# Patient Record
Sex: MALE | Race: WHITE | HISPANIC OR LATINO | Employment: FULL TIME | URBAN - METROPOLITAN AREA
[De-identification: names, ages, dates, MRNs, and addresses within clinical notes are randomized per-mention and may not be internally consistent; named-entity substitution may affect disease eponyms.]

---

## 2017-01-06 ENCOUNTER — HOSPITAL ENCOUNTER (EMERGENCY)
Facility: MEDICAL CENTER | Age: 22
End: 2017-01-06
Attending: EMERGENCY MEDICINE
Payer: MEDICAID

## 2017-01-06 VITALS
TEMPERATURE: 97.3 F | DIASTOLIC BLOOD PRESSURE: 78 MMHG | WEIGHT: 165 LBS | HEART RATE: 74 BPM | RESPIRATION RATE: 18 BRPM | HEIGHT: 71 IN | SYSTOLIC BLOOD PRESSURE: 135 MMHG | BODY MASS INDEX: 23.1 KG/M2

## 2017-01-06 DIAGNOSIS — S16.1XXA NECK MUSCLE STRAIN, INITIAL ENCOUNTER: ICD-10-CM

## 2017-01-06 PROCEDURE — A9270 NON-COVERED ITEM OR SERVICE: HCPCS | Performed by: EMERGENCY MEDICINE

## 2017-01-06 PROCEDURE — 36415 COLL VENOUS BLD VENIPUNCTURE: CPT

## 2017-01-06 PROCEDURE — 700102 HCHG RX REV CODE 250 W/ 637 OVERRIDE(OP): Performed by: EMERGENCY MEDICINE

## 2017-01-06 PROCEDURE — 99284 EMERGENCY DEPT VISIT MOD MDM: CPT

## 2017-01-06 RX ORDER — HYDROCODONE BITARTRATE AND ACETAMINOPHEN 5; 325 MG/1; MG/1
1 TABLET ORAL ONCE
Status: COMPLETED | OUTPATIENT
Start: 2017-01-06 | End: 2017-01-06

## 2017-01-06 RX ADMIN — HYDROCODONE BITARTRATE AND ACETAMINOPHEN 1 TABLET: 5; 325 TABLET ORAL at 21:35

## 2017-01-06 ASSESSMENT — PAIN SCALES - GENERAL: PAINLEVEL_OUTOF10: 4

## 2017-01-06 NOTE — ED AVS SNAPSHOT
1/6/2017          Evens Rocha  [address] [address] [facility] [place] [address]    Dear Evens:    [facility] wants to ensure your discharge home is safe and you or your loved ones have had all your questions answered regarding your care after you leave the hospital.    You may receive a telephone call within two days of your discharge.  This call is to make certain you understand your discharge instructions as well as ensure we provided you with the best care possible during your stay with us.     The call will only last approximately 3-5 minutes and will be done by a nurse.    Once again, we want to ensure your discharge home is safe and that you have a clear understanding of any next steps in your care.  If you have any questions or concerns, please do not hesitate to contact us, we are here for you.  Thank you for choosing [facility] for your healthcare needs.    Sincerely,    Pradeep Mane    [facility]

## 2017-01-06 NOTE — ED AVS SNAPSHOT
[employer] Access Code: [email or web address]  Expires: 2/5/2017  9:37 PM    Your email address is not on file at [facility].  Email Addresses are required for you to sign up for [employer], please contact [phone] to verify your personal information and to provide your email address prior to attempting to register for [employer].    Evens El Salgado  [address] [place] [address]    Club [employer]  A secure, online tool to manage your health information     [facility]’s Club Emp[employer]® is a secure, online tool that connects you to your personalized health information from the privacy of your home -- day or night - making it very easy for you to manage your healthcare. Once the activation process is completed, you can even access your medical information using the Club Emprende gertrude, which is available for free in the Apple Gertrude store or Google Play store.     To learn more about Club Emp[employer], visit [email or web address]    There are two levels of access available (as shown below):   My Chart Features  [facility] Primary Care Doctor [facility]  Specialists [facility]  Urgent  Care Non-[facility] Primary Care Doctor   Email your healthcare team securely and privately 24/7 X X X    Manage appointments: schedule your next appointment; view details of past/upcoming appointments X      Request prescription refills. X      View recent personal medical records, including lab and immunizations X X X X   View health record, including health history, allergies, medications X X X X   Read reports about your outpatient visits, procedures, consult and ER notes X X X X   See your discharge summary, which is a recap of your hospital and/or ER visit that includes your diagnosis, lab results, and care plan X X  X     How to register for [employer]:  Once your e-mail address has been verified, follow the following steps to sign up for [employer].     1. Go to  [email or web address]  2. Click on the Sign Up Now box, which takes you to the New Member Sign Up page.  You will need to provide the following information:  a. Enter your TRA Access Code exactly as it appears at the top of this page. (You will not need to use this code after you’ve completed the sign-up process. If you do not sign up before the expiration date, you must request a new code.)   b. Enter your date of birth.   c. Enter your home email address.   d. Click Submit, and follow the next screen’s instructions.  3. Create a [employer] ID. This will be your TRA login ID and cannot be changed, so think of one that is secure and easy to remember.  4. Create a TRA password. You can change your password at any time.  5. Enter your Password Reset Question and Answer. This can be used at a later time if you forget your password.   6. Enter your e-mail address. This allows you to receive e-mail notifications when new information is available in TRA.  7. Click Sign Up. You can now view your health information.    For assistance activating your TRA account, call [phone]

## 2017-01-07 NOTE — DISCHARGE INSTRUCTIONS
Cervical Sprain  A cervical sprain is when the tissues (ligaments) that hold the neck bones in place stretch or tear.  HOME CARE   · Put ice on the injured area.  · Put ice in a plastic bag.  · Place a towel between your skin and the bag.  · Leave the ice on for 15-20 minutes, 3-4 times a day.  · You may have been given a collar to wear. This collar keeps your neck from moving while you heal.  · Do not take the collar off unless told by your doctor.  · If you have long hair, keep it outside of the collar.  · Ask your doctor before changing the position of your collar. You may need to change its position over time to make it more comfortable.  · If you are allowed to take off the collar for cleaning or bathing, follow your doctor's instructions on how to do it safely.  · Keep your collar clean by wiping it with mild soap and water. Dry it completely. If the collar has removable pads, remove them every 1-2 days to hand wash them with soap and water. Allow them to air dry. They should be dry before you wear them in the collar.  · Do not drive while wearing the collar.  · Only take medicine as told by your doctor.  · Keep all doctor visits as told.  · Keep all physical therapy visits as told.  · Adjust your work station so that you have good posture while you work.  · Avoid positions and activities that make your problems worse.  · Warm up and stretch before being active.  GET HELP IF:  · Your pain is not controlled with medicine.  · You cannot take less pain medicine over time as planned.  · Your activity level does not improve as expected.  GET HELP RIGHT AWAY IF:   · You are bleeding.  · Your stomach is upset.  · You have an allergic reaction to your medicine.  · You develop new problems that you cannot explain.  · You lose feeling (become numb) or you cannot move any part of your body (paralysis).  · You have tingling or weakness in any part of your body.  · Your symptoms get worse. Symptoms include:  · Pain,  soreness, stiffness, puffiness (swelling), or a burning feeling in your neck.  · Pain when your neck is touched.  · Shoulder or upper back pain.  · Limited ability to move your neck.  · Headache.  · Dizziness.  · Your hands or arms feel week, lose feeling, or tingle.  · Muscle spasms.  · Difficulty swallowing or chewing.  MAKE SURE YOU:   · Understand these instructions.  · Will watch your condition.  · Will get help right away if you are not doing well or get worse.     This information is not intended to replace advice given to you by your health care provider. Make sure you discuss any questions you have with your health care provider.     Document Released: 06/05/2009 Document Revised: 08/20/2014 Document Reviewed: 06/25/2014  [employer] Interactive Patient Education ©2016 [employer]    Cryotherapy  Cryotherapy is when you put ice on your injury. Ice helps lessen pain and puffiness (swelling) after an injury. Ice works the best when you start using it in the first 24 to 48 hours after an injury.  HOME CARE  · Put a dry or damp towel between the ice pack and your skin.  · You may press gently on the ice pack.  · Leave the ice on for no more than 10 to 20 minutes at a time.  · Check your skin after 5 minutes to make sure your skin is okay.  · Rest at least 20 minutes between ice pack uses.  · Stop using ice when your skin loses feeling (numbness).  · Do not use ice on someone who cannot tell you when it hurts. This includes small children and people with memory problems (dementia).  GET HELP RIGHT AWAY IF:  · You have white spots on your skin.  · Your skin turns blue or pale.  · Your skin feels waxy or hard.  · Your puffiness gets worse.  MAKE SURE YOU:   · Understand these instructions.  · Will watch your condition.  · Will get help right away if you are not doing well or get worse.     This information is not intended to replace advice given to you by your health care provider. Make sure you discuss any  questions you have with your health care provider.     Document Released: 06/05/2009 Document Revised: 03/11/2013 Document Reviewed: 08/09/2012  [employer] Interactive Patient Education ©2016 [employer] Inc.
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
## 2018-04-18 ENCOUNTER — HOSPITAL ENCOUNTER (EMERGENCY)
Facility: MEDICAL CENTER | Age: 23
End: 2018-04-18
Attending: EMERGENCY MEDICINE
Payer: COMMERCIAL

## 2018-04-18 VITALS
OXYGEN SATURATION: 97 % | DIASTOLIC BLOOD PRESSURE: 84 MMHG | BODY MASS INDEX: 28.49 KG/M2 | TEMPERATURE: 98.2 F | HEIGHT: 71 IN | SYSTOLIC BLOOD PRESSURE: 124 MMHG | HEART RATE: 71 BPM | RESPIRATION RATE: 16 BRPM | WEIGHT: 203.48 LBS

## 2018-04-18 DIAGNOSIS — S05.02XA ABRASION OF LEFT CORNEA, INITIAL ENCOUNTER: ICD-10-CM

## 2018-04-18 PROCEDURE — 700102 HCHG RX REV CODE 250 W/ 637 OVERRIDE(OP): Performed by: EMERGENCY MEDICINE

## 2018-04-18 PROCEDURE — A9270 NON-COVERED ITEM OR SERVICE: HCPCS | Performed by: EMERGENCY MEDICINE

## 2018-04-18 PROCEDURE — 700101 HCHG RX REV CODE 250: Performed by: EMERGENCY MEDICINE

## 2018-04-18 PROCEDURE — 99284 EMERGENCY DEPT VISIT MOD MDM: CPT

## 2018-04-18 RX ORDER — PROPARACAINE HYDROCHLORIDE 5 MG/ML
SOLUTION/ DROPS OPHTHALMIC
Status: DISCONTINUED
Start: 2018-04-18 | End: 2018-04-18 | Stop reason: HOSPADM

## 2018-04-18 RX ORDER — IBUPROFEN 600 MG/1
600 TABLET ORAL ONCE
Status: COMPLETED | OUTPATIENT
Start: 2018-04-18 | End: 2018-04-18

## 2018-04-18 RX ORDER — ERYTHROMYCIN 5 MG/G
OINTMENT OPHTHALMIC EVERY 6 HOURS
Status: DISCONTINUED | OUTPATIENT
Start: 2018-04-18 | End: 2018-04-18 | Stop reason: HOSPADM

## 2018-04-18 RX ADMIN — IBUPROFEN 600 MG: 600 TABLET ORAL at 20:18

## 2018-04-18 RX ADMIN — ERYTHROMYCIN: 5 OINTMENT OPHTHALMIC at 20:18

## 2018-04-18 ASSESSMENT — PAIN SCALES - GENERAL
PAINLEVEL_OUTOF10: 0
PAINLEVEL_OUTOF10: 6

## 2018-04-18 NOTE — LETTER
"  FORM C-4:  EMPLOYEE’S CLAIM FOR COMPENSATION/ REPORT OF INITIAL TREATMENT  EMPLOYEE’S CLAIM - PROVIDE ALL INFORMATION REQUESTED   First Name  Evens Last Name  Bennett Birthdate             Age  1995 22 y.o. Sex  male Claim Number   Home Employee Address  [address]  [facility]                                     Zip  [address] Height  1.803 m (5' 11\") Weight  92.3 kg (203 lb 7.8 oz) [facility]     Mailing Employee Address                           [address]   [facility]               Zip  [address] Telephone  [phone] (home)  Primary Language Spoken  ENGLISH   Insurer  N/A Third Party   [employer] Employee's Occupation (Job Title) When Injury or Occupational Disease Occurred     Employer's Name  [employer] Telephone  [phone]    Employer Address  [address]] Zip  [address]   Date of Injury  4/18/2018       Hour of Injury  5:30 PM Date Employer Notified  4/18/2018 Last Day of Work after Injury or Occupational Disease  4/18/2018 Supervisor to Whom Injury Reported  [place]   Address or Location of Accident (if applicable)  [address] Replaced by [facility]   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 Be specific and answer in detail. Use additional sheet if necessary)  I was grinding a piece of metal with face shield on. some metal went in my eye still   If you believe that you have an occupational disease, when did you first have knowledge of the disability and it relationship to your employment?  n/a Witnesses to the Accident  Cleve Maynard     Nature of Injury or Occupational Disease  Foreign Body  Part(s) of Body Injured or Affected  Eye (L), N/A, N/A    I certify that the above is true and correct to the best of my knowledge and that I have provided this information in order to obtain the benefits of [place] " [place]  I hereby authorize any physician, chiropractor, surgeon, practitioner, or other person, any hospital, including [facility] or [facility] hospital, any medical service organization, any insurance company, or other institution or organization to release to each other, any medical or other information, including benefits paid or payable, pertinent to this injury or disease, except information relative to diagnosis, treatment and/or counseling for AIDS, psychological conditions, alcohol or controlled substances, for which I must give specific authorization.  A Photostat of this authorization shall be as valid as the original.   Date  April 18, 2018 Place  [facility] Employee’s Signature   THIS REPORT MUST BE COMPLETED AND MAILED WITHIN 3 WORKING DAYS OF TREATMENT   Place  [facility], EMERGENCY DEPT  Name of Facility   [facility]   Date  4/18/2018 Diagnosis  (S05.02XA) Abrasion of left cornea, initial encounter Is there evidence the injured employee was under the influence of alcohol and/or another controlled substance at the time of accident?   Hour  8:19 PM Description of Injury or Disease  Abrasion of left cornea, initial encounter No   Treatment  Antibiotics for corneal abrasion and pain control  Have you advised the patient to remain off work five days or more?         No   X-Ray Findings      If Yes   From Date    To Date      From information given by the employee, together with medical evidence, can you directly connect this injury or occupational disease as job incurred?  Yes If No, is the employee capable of: Full Duty  Yes Modified Duty      Is additional medical care by a physician indicated?  No If Modified Duty, Specify any Limitations / Restrictions        Do you know of any previous injury or disease contributing to this condition or  "occupational disease?  No   Date  4/18/2018 Print Doctor’s Name  Yesica Machado I certify the employer’s copy of this form was mailed on:   Address  [address] [place] [address] [phone] Insurer’s Use Only   [facility] Zip  [address]    Provider’s Tax ID Number  [id number] Telephone  Dept: [phone]    Doctor’s Signature  e-YESICA Peres M.D. Degree  MD    Original - TREATING PHYSICIAN OR CHIROPRACTOR   Pg 2-Insurer/TPA   Pg 3-Employer   Pg 4-Employee                                                                                                  Form C-4 (rev01/03)     BRIEF DESCRIPTION OF RIGHTS AND BENEFITS  (Pursuant to [place] 616C.[place])    Notice of Injury or Occupational Disease (Incident Report Form C-1): If an injury or occupational disease (OD) arises out of and in the course of employment, you must provide written notice to your employer as soon as practicable, but no later than 7 days after the accident or OD. Your employer shall maintain a sufficient supply of the required forms.    Claim for Compensation (Form C-4): If medical treatment is sought, the form C-4 is available at the place of initial treatment. A completed \"Claim for Compensation\" (Form C-4) must be filed within 90 days after an accident or OD. The treating physician or chiropractor must, within 3 working days after treatment, complete and mail to the employer, the employer's insurer and third-party , the Claim for Compensation.    Medical Treatment: If you require medical treatment for your on-the-job injury or OD, you may be required to select a physician or chiropractor from a list provided by your workers’ compensation insurer, if it has contracted with an Organization for Managed Care (MCO) or Preferred Provider Organization (PPO) or providers of health care. If your employer has not entered into a contract with an MCO or PPO, you may select a physician or chiropractor from the Panel of " Physicians and Chiropractors. Any medical costs related to your industrial injury or OD will be paid by your insurer.    Temporary Total Disability (TTD): If your doctor has certified that you are unable to work for a period of at least 5 consecutive days, or 5 cumulative days in a 20-day period, or places restrictions on you that your employer does not accommodate, you may be entitled to TTD compensation.    Temporary Partial Disability (TPD): If the wage you receive upon reemployment is less than the compensation for TTD to which you are entitled, the insurer may be required to pay you TPD compensation to make up the difference. TPD can only be paid for a maximum of 24 months.    Permanent Partial Disability (PPD): When your medical condition is stable and there is an indication of a PPD as a result of your injury or OD, within 30 days, your insurer must arrange for an evaluation by a rating physician or chiropractor to determine the degree of your PPD. The amount of your PPD award depends on the date of injury, the results of the PPD evaluation and your age and wage.    Permanent Total Disability (PTD): If you are medically certified by a treating physician or chiropractor as permanently and totally disabled and have been granted a PTD status by your insurer, you are entitled to receive monthly benefits not to exceed 66 2/3% of your average monthly wage. The amount of your PTD payments is subject to reduction if you previously received a PPD award.    Vocational Rehabilitation Services: You may be eligible for vocational rehabilitation services if you are unable to return to the job due to a permanent physical impairment or permanent restrictions as a result of your injury or occupational disease.    Transportation and Per Jad Reimbursement: You may be eligible for travel expenses and per jad associated with medical treatment.  Reopening: You may be able to reopen your claim if your condition worsens after  claim closure.    Appeal Process: If you disagree with a written determination issued by the insurer or the insurer does not respond to your request, you may appeal to the Department of Administration, , by following the instructions contained in your determination letter. You must appeal the determination within 70 days from the date of the determination letter at [address], [address], [place], [place] [address], or [address], [address], [place], [place] [address]. If you disagree with the  decision, you may appeal to the Department of Administration, . You must file your appeal within 30 days from the date of the  decision letter at [address], [address], [place], [place] [address], or [address], [facility] [address], [place], [place] [address]. If you disagree with a decision of an , you may file a petition for judicial review with the District Court. You must do so within 30 days of the Appeal Officer’s decision. You may be represented by an  at your own expense or you may contact the [facility] for possible representation.    [place]  for Injured Workers (NAIW): If you disagree with a  decision, you may request that NAIW represent you without charge at an  Hearing. For information regarding denial of benefits, you may contact the NA at: [address], [address], [place] [address], [phone], or [address], [facility] [address], [place] [address], [phone]    To File a Complaint with the Division: If you wish to file a complaint with the  of the Division of Industrial Relations (DIR), please contact the Workers’ Compensation Section, [address], [address] [address], [place], [place] [address], telephone [phone]) [phone], or [address], [place] [address], telephone [phone].    For assistance  with Workers’ Compensation Issues: you may contact the Office of the Governor Consumer Health Assistance, [address] [place] [address], [facility] [address], [place], [place] [address], Toll Free 1-440.427.3281, Web site: http://govcha.[facility].nv.us, E-mail [email or web address]                                                                                                                                                                               __________________________________________________________________                                    April 18, 2018            Employee Name / Signature                                                                                                                            Date                                       D-2 (rev. 10/07)

## 2018-04-19 NOTE — DISCHARGE INSTRUCTIONS
Corneal Abrasion  The cornea is the clear covering at the front and center of the eye. When looking at the colored portion of the eye (iris), you are looking through the cornea. This very thin tissue is made up of many layers. The surface layer is a single layer of cells (corneal epithelium) and is one of the most sensitive tissues in the body. If a scratch or injury causes the corneal epithelium to come off, it is called a corneal abrasion. If the injury extends to the tissues below the epithelium, the condition is called a corneal ulcer.  CAUSES   · Scratches.  · Trauma.  · Foreign body in the eye.  Some people have recurrences of abrasions in the area of the original injury even after it has healed (recurrent erosion syndrome). Recurrent erosion syndrome generally improves and goes away with time.  SYMPTOMS   · Eye pain.  · Difficulty or inability to keep the injured eye open.  · The eye becomes very sensitive to light.  · Recurrent erosions tend to happen suddenly, first thing in the morning, usually after waking up and opening the eye.  DIAGNOSIS   Your health care provider can diagnose a corneal abrasion during an eye exam. Dye is usually placed in the eye using a drop or a small paper strip moistened by your tears. When the eye is examined with a special light, the abrasion shows up clearly because of the dye.  TREATMENT   · Small abrasions may be treated with antibiotic drops or ointment alone.  · A pressure patch may be put over the eye. If this is done, follow your doctor's instructions for when to remove the patch. Do not drive or use machines while the eye patch is on. Judging distances is hard to do with a patch on.  If the abrasion becomes infected and spreads to the deeper tissues of the cornea, a corneal ulcer can result. This is serious because it can cause corneal scarring. Corneal scars interfere with light passing through the cornea and cause a loss of vision in the involved eye.  HOME CARE  INSTRUCTIONS  · Use medicine or ointment as directed. Only take over-the-counter or prescription medicines for pain, discomfort, or fever as directed by your health care provider.  · Do not drive or operate machinery if your eye is patched. Your ability to  distances is impaired.  · If your health care provider has given you a follow-up appointment, it is very important to keep that appointment. Not keeping the appointment could result in a severe eye infection or permanent loss of vision. If there is any problem keeping the appointment, let your health care provider know.  SEEK MEDICAL CARE IF:   · You have pain, light sensitivity, and a scratchy feeling in one eye or both eyes.  · Your pressure patch keeps loosening up, and you can blink your eye under the patch after treatment.  · Any kind of discharge develops from the eye after treatment or if the lids stick together in the morning.  · You have the same symptoms in the morning as you did with the original abrasion days, weeks, or months after the abrasion healed.  This information is not intended to replace advice given to you by your health care provider. Make sure you discuss any questions you have with your health care provider.  Document Released: 12/15/2001 Document Revised: 09/07/2016 Document Reviewed: 08/25/2014  Elsevier Interactive Patient Education © 2017 Elsevier Inc.

## 2018-04-19 NOTE — ED PROVIDER NOTES
"ED Provider Note    CHIEF COMPLAINT  Chief Complaint   Patient presents with   • Foreign Body in Eye     Left, feels there may be metal in eye from Grinding       HPI  Evens Rocha is a 22 y.o. male who presents stating that earlier today he was grinding metal at work and had a cover over his eyes so is not sure how pieces of metal seemed to get up into his left eye but they did and he immediately irrigated at work and feels like there is a few pieces of metal that left his eye but there may be something still in there. He says that he has eye irritation but no significant cloudy vision, blurry vision. Denies any painful extraocular movements or other complaints    ROS  Pertinent negative for double vision.    PAST MEDICAL HISTORY  History reviewed. No pertinent past medical history.    FAMILY HISTORY  History reviewed. No pertinent family history.    SOCIAL HISTORY   reports that he quit smoking about 2 years ago. His smoking use included Cigarettes. He quit after 0.00 years of use. He has never used smokeless tobacco. He reports that he drinks alcohol. He reports that he does not use drugs.    SURGICAL HISTORY  History reviewed. No pertinent surgical history.    CURRENT MEDICATIONS  Home Medications    **Home medications have not yet been reviewed for this encounter**         ALLERGIES  No Known Allergies    PHYSICAL EXAM  VITAL SIGNS: /98   Pulse 70   Temp 36.8 °C (98.2 °F)   Resp 16   Ht 1.803 m (5' 11\")   Wt 92.3 kg (203 lb 7.8 oz)   SpO2 97%   BMI 28.38 kg/m²    Constitutional: Well developed, Well nourished, No acute distress, Non-toxic appearance.   Eyes: Right eye examination is unremarkable. PERRLA, EOMI, no proptosis. Slit lamp examination left eye reveals 3 tiny punctate areas of fluorescein uptake consistent with small corneal abrasion. I suspect this is where the metallic foreign bodies once did reside and cause damage area there is negative Larissa sign. No evidence of " conjunctival or scleral inflammation at the limbus  Neurologic: Intact  Psychiatric:       COURSE & MEDICAL DECISION MAKING  Pertinent Labs & Imaging studies reviewed. (See chart for details)    Patient's visual acuity was OD 20/30, OS 2015 both eyes 25  The patient's slit-lamp examination revealed tiny punctate corneal abrasions ×3. None of these are very vague but they are certainly cause him pain for he is given erythromycin antibiotic ointment to be used 3 times a day for the next 3 days. He will return of any significant change in symptoms but I don't think he will need any follow-up for occupational health definitively but he is given this referral as this is standard protocol    Patient understands this plan of care and will return of any significant change in symptoms    FINAL IMPRESSION  1. Abrasion of left cornea, initial encounter    Slit-lamp examination by ERP    Electronically signed by: Johnathan Machado, 4/18/2018 8:13 PM

## 2018-04-19 NOTE — ED NOTES
"Chief Complaint   Patient presents with   • Foreign Body in Eye     Left, feels there may be metal in eye from Grinding     Pt states was wearing a face shield and did rinse eye, states vision is \"blurry\" in Left eye    /98   Pulse 70   Temp 36.8 °C (98.2 °F)   Resp 16   Ht 1.803 m (5' 11\")   Wt 92.3 kg (203 lb 7.8 oz)   SpO2 97%   BMI 28.38 kg/m²     "

## 2018-04-23 ENCOUNTER — OCCUPATIONAL MEDICINE (OUTPATIENT)
Dept: OCCUPATIONAL MEDICINE | Facility: CLINIC | Age: 23
End: 2018-04-23
Payer: COMMERCIAL

## 2018-04-23 VITALS
BODY MASS INDEX: 28 KG/M2 | RESPIRATION RATE: 14 BRPM | OXYGEN SATURATION: 96 % | DIASTOLIC BLOOD PRESSURE: 80 MMHG | HEART RATE: 66 BPM | SYSTOLIC BLOOD PRESSURE: 126 MMHG | WEIGHT: 200 LBS | HEIGHT: 71 IN

## 2018-04-23 DIAGNOSIS — H18.822 CORNEAL ABRASION OF LEFT EYE DUE TO CONTACT LENS: ICD-10-CM

## 2018-04-23 PROCEDURE — 99202 OFFICE O/P NEW SF 15 MIN: CPT | Performed by: PREVENTIVE MEDICINE

## 2018-04-23 ASSESSMENT — PAIN SCALES - GENERAL: PAINLEVEL: NO PAIN

## 2018-04-23 NOTE — LETTER
[address],   [address] [place] [address]  Phone:  [phone] - Fax:  [phone]   [facility] Progress Report and Disability Certification  Date of Service: 4/23/2018   No Show:  No  Date / Time of Next Visit:  MMI   Claim Information   Patient Name: Evens Hamilton  Claim Number:     Employer:   [employer] Date of Injury: 4/18/2018     Insurer / TPA: Indira  ID / SSN:     Occupation: Maintenance  Diagnosis: The encounter diagnosis was Corneal abrasion of left eye due to contact lens.    Medical Information   Related to Industrial Injury? Yes    Subjective Complaints:  Date of injury 4/18/2018. Mechanism of injury-grinding metal, debris and left eye. 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 He was seen in emergency department a few days ago. He reports he is much improved with no pain. He does note some minimal residual photophobia.   Objective Findings: Appearance: Well-developed, well-nourished.   Mental Status: Mood and Affect normal. Pleasant. Cooperative. Appropriate.   ENT: Oropharynx clear. Moist mucous membranes. Hearing normal.   Eyes: Pupils reactive. Conjunctiva normal. No scleral icterus.   Neck: Trachea Midline. No thyromegaly. No masses.  Cardiovascular: Normal rate. Regular rhythm. Normal heart sounds.   Chest: Effort normal. Breath sounds clear.   Skin: Skin is warm and dry. No rash.      Pre-Existing Condition(s):     Assessment:   Condition Improved    Status: Discharged /  MMI  Permanent Disability:No    Plan:      Diagnostics:      Comments:       Disability Information   Status: Released to Full Duty    From:  4/23/2018  Through:   Restrictions are:     Physical Restrictions   Sitting:    Standing:    Stooping:    Bending:      Squatting:    Walking:    Climbing:    Pushing:      Pulling:    Other:    Reaching Above Shoulder (L):   Reaching Above Shoulder (R):       Reaching Below Shoulder  (L):    Reaching Below Shoulder (R):      Not to exceed Weight Limits   Carrying(hrs):   Weight Limit(lb):   Lifting(hrs):   Weight  Limit(lb):     Comments:      Repetitive Actions   Hands: i.e. Fine Manipulations from Grasping:     Feet: i.e. Operating Foot Controls:     Driving / Operate Machinery:     Physician Name: Stuart Cancino M.D. Physician Signature: STUART Reich M.D. e-Signature: Dr. Shamir Mckeon, Medical Director   Clinic Name / Location: [address],   [address]  [place] [address] Clinic Phone Number: Dept: [phone]   Appointment Time: 1:00 Pm Visit Start Time: 1:02 PM   Check-In Time:  12:53 Pm Visit Discharge Time:  1:26 PM   Original-Treating Physician or Chiropractor    Page 2-Insurer/TPA    Page 3-Employer    Page 4-Employee

## 2018-04-23 NOTE — PROGRESS NOTES
"Subjective:      Evens Hamilton is a 22 y.o. male who presents with Follow-Up ( DOI 04/18/2018 - L Eye - better - room 24)      Date of injury 4/18/2018. Mechanism of injury-grinding metal, debris and left eye. 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 He was seen in emergency department a few days ago. He reports he is much improved with no pain. He does note some minimal residual photophobia.     HPI    ROS  Comprehensive medical history form reviewed. Pertinent positives and negatives included in HPI.    PFSH: reviewed in Epic    PMH:  has no past medical history on file.  MEDS:   Current Outpatient Prescriptions:   •  naproxen (NAPROSYN) 500 MG TABS, Take 1 Tab by mouth 2 times a day, with meals., Disp: 21 Tab, Rfl: 0  ALLERGIES: No Known Allergies  SURGHX: History reviewed. No pertinent surgical history.  SOCHX:  reports that he quit smoking about 2 years ago. His smoking use included Cigarettes. He quit after 0.00 years of use. He has never used smokeless tobacco. He reports that he drinks alcohol. He reports that he does not use drugs.  Work Status:  [employer]  FH: No pertinent hereditary disorders.        Objective:     /80   Pulse 66   Resp 14   Ht 1.803 m (5' 11\")   Wt 90.7 kg (200 lb)   SpO2 96%   BMI 27.89 kg/m²      Physical Exam    Appearance: Well-developed, well-nourished.   Mental Status: Mood and Affect normal. Pleasant. Cooperative. Appropriate.   ENT: Oropharynx clear. Moist mucous membranes. Hearing normal.   Eyes: Pupils reactive. Conjunctiva normal. No scleral icterus.   Neck: Trachea Midline. No thyromegaly. No masses.  Cardiovascular: Normal rate. Regular rhythm. Normal heart sounds.   Chest: Effort normal. Breath sounds clear.   Skin: Skin is warm and dry. No rash.          Assessment/Plan:     1. Corneal abrasion of left eye due to contact lens  New to Occupational Health from emergency department   Condition " improved  Regular work  Release from care

## 2018-10-18 ENCOUNTER — NON-PROVIDER VISIT (OUTPATIENT)
Dept: URGENT CARE | Facility: PHYSICIAN GROUP | Age: 23
End: 2018-10-18

## 2018-10-18 DIAGNOSIS — Z02.1 PRE-EMPLOYMENT DRUG SCREENING: ICD-10-CM

## 2018-10-18 LAB
AMP AMPHETAMINE: NORMAL
COC COCAINE: NORMAL
INT CON NEG: NEGATIVE
INT CON POS: POSITIVE
MET METHAMPHETAMINES: NORMAL
OPI OPIATES: NORMAL
PCP PHENCYCLIDINE: NORMAL
POC DRUG COMMENT 753798-OCCUPATIONAL HEALTH: NEGATIVE
THC: NORMAL

## 2018-10-18 PROCEDURE — 80305 DRUG TEST PRSMV DIR OPT OBS: CPT | Performed by: PHYSICIAN ASSISTANT

## 2019-01-31 ENCOUNTER — OCCUPATIONAL MEDICINE (OUTPATIENT)
Dept: URGENT CARE | Facility: PHYSICIAN GROUP | Age: 24
End: 2019-01-31
Payer: COMMERCIAL

## 2019-01-31 VITALS
DIASTOLIC BLOOD PRESSURE: 70 MMHG | OXYGEN SATURATION: 96 % | HEIGHT: 71 IN | RESPIRATION RATE: 18 BRPM | HEART RATE: 74 BPM | TEMPERATURE: 98 F | BODY MASS INDEX: 27.3 KG/M2 | WEIGHT: 195 LBS | SYSTOLIC BLOOD PRESSURE: 124 MMHG

## 2019-01-31 DIAGNOSIS — S39.012A LOW BACK STRAIN, INITIAL ENCOUNTER: ICD-10-CM

## 2019-01-31 PROCEDURE — 99214 OFFICE O/P EST MOD 30 MIN: CPT | Performed by: PHYSICIAN ASSISTANT

## 2019-01-31 ASSESSMENT — ENCOUNTER SYMPTOMS
TINGLING: 0
BACK PAIN: 1
FOCAL WEAKNESS: 0

## 2019-01-31 NOTE — LETTER
[facility]  [address] [place] [address]. [address] - [place] [address]  Phone:  [phone] - Fax:  [phone]   [facility] Progress Report and Disability Certification  Date of Service: 2019   No Show:  No  Date / Time of Next Visit: 2019@2:00 PM   Claim Information   Patient Name: Evens Hamilton  Claim Number:     Employer: [employer]  Date of Injury: 2019     Insurer / TPA:    ID / SSN:     Occupation:  Lead  Diagnosis: There were no encounter diagnoses.    Medical Information   Related to Industrial Injury? Yes    Subjective Complaints:  DOI: 19 around 5pm Patient is a  at [employer] and was loading carriages into a scissors lift. After lifting 5 or 6 carriages his back started hurting.  He said that he told his coworker that he needed to sit down. He took Advil with no relief and iced it at work. Patient states the back pain hurt into his left buttock when it initially happened.  It subsides if he sits still but is aggravated with any moving. He states his pain is a 9.5/10.    Objective Findings: Tenderness upon palpation of lower back. Patient was in obvious pain throughout exam as he was bracing himself on the exam table with both arms.  Patient had full range of motion of his upper extremities. Patient had pain with any twisting or bending.  Low back muscles felt tense upon palpation   Pre-Existing Condition(s):     Assessment:   Initial Visit    Status: Additional Care Required  Permanent Disability:No    Plan: Medication    Diagnostics:      Comments:       Disability Information   Status: Released to Restricted Duty    From:  2019  Through: 2019 Restrictions are: Temporary   Physical Restrictions   Sitting:    Standing:  < or = to 2 hrs/day Stoopin hrs/day Bendin hrs/day   Squatting:    Walking:    Climbin hrs/day Pushin hrs/day   Pullin hrs/day Other:    Reaching Above  Shoulder (L): 0 hrs/day Reaching Above Shoulder (R): 0 hrs/day     Reaching Below Shoulder (L):  0 hrs/day Reaching Below Shoulder (R):  0 hrs/day   Not to exceed Weight Limits   Carrying(hrs):   Weight Limit(lb): < or = to 10 pounds Lifting(hrs):   Weight  Limit(lb): < or = to 10 pounds   Comments: Patient will try OTC Ibuprofen/Tylenol and encouraged to use a heating pack for low back pain.  He was encouraged to get plenty of rest over the weekend. Plan to follow up in 5 days 2/5/19.    Repetitive Actions   Hands: i.e. Fine Manipulations from Grasping:     Feet: i.e. Operating Foot Controls:     Driving / Operate Machinery:     Physician Name: Emre Conti P.A.-C. Physician Signature: EMRE Cox P.A.-C. e-Signature: Dr. Shamir Mckeon, Medical Director   Clinic Name / Location: [address]  [place] [address] Clinic Phone Number: Dept: [phone]   Appointment Time: 6:45 Pm Visit Start Time: 6:57 PM   Check-In Time:  6:54 Pm Visit Discharge Time: 8:10 PM   Original-Treating Physician or Chiropractor    Page 2-Insurer/TPA    Page 3-Employer    Page 4-Employee

## 2019-01-31 NOTE — LETTER
"EMPLOYEE’S CLAIM FOR COMPENSATION/ REPORT OF INITIAL TREATMENT  FORM C-4    EMPLOYEE’S CLAIM - PROVIDE ALL INFORMATION REQUESTED   First Name  Evens Last Name  Bennett Birthdate                    1995                Sex  male Claim Number   Home Address  [address] Age  23 y.o. 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 (if applicable)  Lifting Carriages    How did this injury or occupational disease occur? (Be specific an answer in detail. 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 After lifting a few my lower back was hurt.   If you believe that you have an occupational disease, when did you first have knowledge of the disability and it relationship to your employment?  N/A Witnesses to the Accident  Jaswinder Silva      Nature of Injury or Occupational Disease  Strain  Part(s) of Body Injured or Affected  Lower Back Area (Lumbar Area & Lumbo-Sacral), ,     I certify that the above is true and correct to the best of my knowledge and " that I have provided this information in order to obtain the benefits of [place]  I hereby authorize any physician, chiropractor, surgeon, practitioner, or other person, any hospital, including [facility] or [facility] hospital, any medical service organization, any insurance company, or other institution or organization to release to each other, any medical or other information, including benefits paid or payable, pertinent to this injury or disease, except information relative to diagnosis, treatment and/or counseling for AIDS, psychological conditions, alcohol or controlled substances, for which I must give specific authorization.  A Photostat of this authorization shall be as valid as the original.     Date   Place   Employee’s Signature   THIS REPORT MUST BE COMPLETED AND MAILED WITHIN 3 WORKING DAYS OF TREATMENT   Place  [facility]  Name of Facility  [facility]   Date  1/31/2019 Diagnosis  No diagnosis found. Is there evidence the injured employee was under the influence of alcohol and/or another controlled substance at the time of accident?   Hour  6:57 PM Description of Injury or Disease  There were no encounter diagnoses. No   Treatment  Patient will try OTC Ibuprofen/Tylenol and encouraged to use a heating pack for low back pain.  He was encouraged to get plenty of rest over the weekend. Plan to follow up in 5 days 2/5/19. Return to clinic if pain becomes to severe.  Have you advised the patient to remain off work five days or more? No   X-Ray Findings      If Yes   From Date  To Date      From information given by the employee, together with medical evidence, can you directly connect this injury or occupational disease as job incurred?  Yes If No Full Duty  No Modified Duty  Yes   Is additional medical care by a physician indicated?  Yes If Modified Duty, Specify any  "Limitations / Restrictions  See D39   Do you know of any previous injury or disease contributing to this condition or occupational disease?                            No   Date  1/31/2019 Print Doctor’s Name Emre Conti P.A.-C. I certify the employer’s copy of  this form was mailed on:   Address  [address] [place] [address]. [address] Insurer’s Use Only     [facility] Zip  [address]    Provider’s Tax ID Number  [id number] Telephone  Dept: [phone]        e-EMRE Melendrez P.A.-C.   e-Signature: Dr. Shamir Mckeon, Medical Director Degree  PAC        ORIGINAL-TREATING PHYSICIAN OR CHIROPRACTOR    PAGE 2-INSURER/TPA    PAGE 3-EMPLOYER    PAGE 4-EMPLOYEE             Form C-4 (rev10/07)              BRIEF DESCRIPTION OF RIGHTS AND BENEFITS  (Pursuant to [place] 616C.050)    Notice of Injury or Occupational Disease (Incident Report Form C-1): If an injury or occupational disease (OD) arises out of and in the  course of employment, you must provide written notice to your employer as soon as practicable, but no later than 7 days after the accident or  OD. Your employer shall maintain a sufficient supply of the required forms.    Claim for Compensation (Form C-4): If medical treatment is sought, the form C-4 is available at the place of initial treatment. A completed  \"Claim for Compensation\" (Form C-4) must be filed within 90 days after an accident or OD. The treating physician or chiropractor must,  within 3 working days after treatment, complete and mail to the employer, the employer's insurer and third-party , the Claim for  Compensation.    Medical Treatment: If you require medical treatment for your on-the-job injury or OD, you may be required to select a physician or  chiropractor from a list provided by your workers’ compensation insurer, if it has contracted with an Organization for Managed Care (MCO) or  Preferred Provider Organization (PPO) or providers of health care. If your employer has not " entered into a contract with an MCO or PPO, you  may select a physician or chiropractor from the Panel of Physicians and Chiropractors. Any medical costs related to your industrial injury or  OD will be paid by your insurer.    Temporary Total Disability (TTD): If your doctor has certified that you are unable to work for a period of at least 5 consecutive days, or 5  cumulative days in a 20-day period, or places restrictions on you that your employer does not accommodate, you may be entitled to TTD  compensation.    Temporary Partial Disability (TPD): If the wage you receive upon reemployment is less than the compensation for TTD to which you are  entitled, the insurer may be required to pay you TPD compensation to make up the difference. TPD can only be paid for a maximum of 24  months.    Permanent Partial Disability (PPD): When your medical condition is stable and there is an indication of a PPD as a result of your injury or  OD, within 30 days, your insurer must arrange for an evaluation by a rating physician or chiropractor to determine the degree of your PPD. The  amount of your PPD award depends on the date of injury, the results of the PPD evaluation and your age and wage.    Permanent Total Disability (PTD): If you are medically certified by a treating physician or chiropractor as permanently and totally disabled  and have been granted a PTD status by your insurer, you are entitled to receive monthly benefits not to exceed 66 2/3% of your average  monthly wage. The amount of your PTD payments is subject to reduction if you previously received a PPD award.    Vocational Rehabilitation Services: You may be eligible for vocational rehabilitation services if you are unable to return to the job due to a  permanent physical impairment or permanent restrictions as a result of your injury or occupational disease.    Transportation and Per Jad Reimbursement: You may be eligible for travel expenses and per jad  associated with medical treatment.    Reopening: You may be able to reopen your claim if your condition worsens after claim closure.    Appeal Process: If you disagree with a written determination issued by the insurer or the insurer does not respond to your request, you may  appeal to the Department of Administration, , by following the instructions contained in your determination letter. You must  appeal the determination within 70 days from the date of the determination letter at [address], [address], [place], [place]  [address], or [address], [address], [place], [place] [address]. If you disagree with the  decision, you may appeal to the  Department of Administration, . You must file your appeal within 30 days from the date of the  decision  letter at [address], [address], [place], [place] [address], or [address] [facility] [facility] [address] [facility] [address], [place], [place] [address]. If you  disagree with a decision of an , you may file a petition for judicial review with the District Court. You must do so within 30  days of the Appeal Officer’s decision. You may be represented by an  at your own expense or you may contact the [facility] for possible  representation.    [place]  for Injured Workers (NAIW): If you disagree with a  decision, you may request that NAIW represent you  without charge at an  Hearing. For information regarding denial of benefits, you may contact the [facility] at: [address], [address], [place] [address], [phone]) [phone], or [address], [address], [place] [address], [phone]) [phone]    To File a Complaint with the Division: If you wish to file a complaint with the  of the Division of Industrial Relations (DIR),  please contact the Workers’ Compensation Section, [address], [address], [place], [place] [address], telephone  (549) [phone], or  [address], [address], [place], [place] [address], telephone [phone]) [phone].    For assistance with Workers’ Compensation Issues: you may contact the Office of the Governor Consumer Health Assistance, [address] [place] [address], [address], [place], [place] [address], Toll Free 1-628.489.1194, Web site: http://govcha.[facility].nv., E-mail  [email or web address].nv.                                                                                                                                                                                                                                   __________________________________________________________________                                                                   _________________                Employee Name / Signature                                                                                                                                                       Date                                                                                                                                                                                                     D-2 (rev. 10/07)

## 2019-02-01 NOTE — PROGRESS NOTES
"Subjective:      Evens Hamilton is a 23 y.o. male who presents with Back Pain (lower back, strained back at work X today )      DOI: 1/31/19 around 5pm Patient is a  at [employer] and was loading carriages into a scissors lift. After lifting 5 or 6 carriages his back started hurting.  He said that he told his coworker that he needed to sit down. He took Advil with no relief and iced it at work. Patient states the back pain hurt into his left buttock when it initially happened.  It subsides if he sits still but is aggravated with any moving. He states his pain is a 9.5/10.      Back Pain   This is a new problem. The current episode started today. The problem occurs constantly. The problem is unchanged. The pain is present in the sacro-iliac. The quality of the pain is described as aching. The pain does not radiate. The pain is at a severity of 9/10. The symptoms are aggravated by bending, standing and twisting. Pertinent negatives include no tingling. He has tried NSAIDs and ice for the symptoms. The treatment provided no relief.       Review of Systems   Musculoskeletal: Positive for back pain.   Neurological: Negative for tingling and focal weakness.   All other systems reviewed and are negative.         Objective:     /70   Pulse 74   Temp 36.7 °C (98 °F)   Resp 18   Ht 1.803 m (5' 11\")   Wt 88.5 kg (195 lb)   SpO2 96%   BMI 27.20 kg/m²      Physical Exam   Constitutional: He is oriented to person, place, and time. Vital signs are normal. He appears well-developed and well-nourished. No distress.   HENT:   Head: Normocephalic and atraumatic.   Eyes: Pupils are equal, round, and reactive to light.   Neck: Normal range of motion.   Cardiovascular: Normal rate, regular rhythm and normal heart sounds.    Pulmonary/Chest: Effort normal and breath sounds normal.   Musculoskeletal:        Lumbar back: He exhibits decreased range of motion, tenderness and pain.        " Back:    Neurological: He is alert and oriented to person, place, and time.   Skin: Skin is warm and dry.   Psychiatric: He has a normal mood and affect.   Vitals reviewed.      Tenderness upon palpation of lower back. Patient was in obvious pain throughout exam as he was bracing himself on the exam table with both arms.  Patient had full range of motion of his upper extremities. Patient had pain with any twisting or bending.  Low back muscles felt tense upon palpation       Assessment/Plan:   Low Back Strain     Patient will try OTC Ibuprofen/Tylenol and encouraged to use a heating pack for low back pain.  He was encouraged to get plenty of rest over the weekend. Plan to follow up in 5 days 2/5/19. Return to clinic if pain becomes to severe.   Patient agreeable to this plan

## 2019-02-05 ENCOUNTER — OCCUPATIONAL MEDICINE (OUTPATIENT)
Dept: URGENT CARE | Facility: PHYSICIAN GROUP | Age: 24
End: 2019-02-05
Payer: COMMERCIAL

## 2019-02-05 ENCOUNTER — APPOINTMENT (OUTPATIENT)
Dept: RADIOLOGY | Facility: IMAGING CENTER | Age: 24
End: 2019-02-05
Attending: PHYSICIAN ASSISTANT
Payer: COMMERCIAL

## 2019-02-05 ENCOUNTER — APPOINTMENT (OUTPATIENT)
Dept: RADIOLOGY | Facility: IMAGING CENTER | Age: 24
End: 2019-02-05
Attending: PHYSICIAN ASSISTANT
Payer: MEDICAID

## 2019-02-05 VITALS
SYSTOLIC BLOOD PRESSURE: 122 MMHG | BODY MASS INDEX: 27.3 KG/M2 | TEMPERATURE: 99.1 F | HEART RATE: 80 BPM | OXYGEN SATURATION: 98 % | DIASTOLIC BLOOD PRESSURE: 80 MMHG | HEIGHT: 71 IN | WEIGHT: 195 LBS

## 2019-02-05 DIAGNOSIS — S39.012A STRAIN OF LUMBAR REGION, INITIAL ENCOUNTER: ICD-10-CM

## 2019-02-05 PROCEDURE — 99214 OFFICE O/P EST MOD 30 MIN: CPT | Mod: 29 | Performed by: PHYSICIAN ASSISTANT

## 2019-02-05 PROCEDURE — 72100 X-RAY EXAM L-S SPINE 2/3 VWS: CPT | Mod: TC,29 | Performed by: PHYSICIAN ASSISTANT

## 2019-02-05 RX ORDER — METHYLPREDNISOLONE 4 MG/1
TABLET ORAL
Qty: 21 TAB | Refills: 0 | Status: SHIPPED | OUTPATIENT
Start: 2019-02-05 | End: 2019-02-19

## 2019-02-05 RX ORDER — IBUPROFEN 200 MG
200 TABLET ORAL EVERY 6 HOURS PRN
COMMUNITY
End: 2019-07-28

## 2019-02-05 NOTE — PROGRESS NOTES
"Subjective:      Evens Hamilton is a 23 y.o. male who presents with Follow-Up (Lower back strain )      DOI: Left lumbar pain. Improving. Intermittent radiation down to foot. Taking OTC prn pain.  Pain worse with twisting and bending.  Denies bowel/bladder incontinence, fever, numbness. On light duty tolerating well.      HPI    ROS    Medications, Allergies, and current problem list reviewed today in Epic     Objective:     /80 (BP Location: Right arm, Patient Position: Sitting, BP Cuff Size: Large adult)   Pulse 80   Temp 37.3 °C (99.1 °F) (Temporal)   Ht 1.803 m (5' 11\")   Wt 88.5 kg (195 lb)   SpO2 98%   BMI 27.20 kg/m²      Physical Exam   Constitutional: He is oriented to person, place, and time. He appears well-developed and well-nourished. No distress.   HENT:   Head: Normocephalic and atraumatic.   Eyes: Conjunctivae are normal.   Neck: Normal range of motion. Neck supple.   Cardiovascular: Normal rate, regular rhythm and normal heart sounds.    No murmur heard.  Pulmonary/Chest: Effort normal and breath sounds normal. No respiratory distress. He has no wheezes. He has no rales.   Neurological: He is alert and oriented to person, place, and time.   Skin: Skin is warm and dry. He is not diaphoretic.   Psychiatric: He has a normal mood and affect. His behavior is normal. Judgment and thought content normal.   Nursing note and vitals reviewed.      Bilateral lumbar paraspinal tenderness left worse than right.  He does have some lower lumbar midline bony tenderness.  Straight leg raise negative.  Lower extremity strength and DTRs equal.  Forward flexion limited.  No ataxia.       Assessment/Plan:     1. Strain of lumbar region, initial encounter  DX-LUMBAR SPINE-2 OR 3 VIEWS    MethylPREDNISolone (MEDROL DOSEPAK) 4 MG Tablet Therapy Pack     Xray: no fracture or dislocation by my read. Radiology review pending.    X-ray negative.  Patient improving but still having midline tenderness with " some left-sided radicular symptoms..  Continue restrictions  Medrol  OTC meds and conservative measures as discussed  Return to clinic or go to ED if symptoms worsen or persist. Indications for ED discussed at length. Patient voices understanding. Red flags discussed.All side effects of medication discussed including allergic response, GI upset, tendon injury, etc.      Please note that this dictation was created using voice recognition software. I have made every reasonable attempt to correct obvious errors, but I expect that there are errors of grammar and possibly content that I did not discover before finalizing the note.

## 2019-02-05 NOTE — LETTER
[facility]  [address] [place] [address]. [address] - [place] [address]  Phone:  [phone] - Fax:  [phone]   [facility] Progress Report and Disability Certification  Date of Service: 2/5/2019   No Show:  No  Date / Time of Next Visit: 2/12/2019   Claim Information   Patient Name: Evens Hamilton  Claim Number:     Employer: [employer]  Date of Injury: 1/31/2019     Insurer / TPA: Indira  ID / SSN:     Occupation:  Lead  Diagnosis: The encounter diagnosis was Strain of lumbar region, initial encounter.    Medical Information   Related to Industrial Injury? Yes    Subjective Complaints:  DOI: Left lumbar pain. Improving. Intermittent radiation down to foot. Taking OTC prn pain.  Pain worse with twisting and bending.  Denies bowel/bladder incontinence, fever, numbness. On light duty tolerating well.    Objective Findings: Bilateral lumbar paraspinal tenderness left worse than right.  He does have some lower lumbar midline bony tenderness.  Straight leg raise negative.  Lower extremity strength and DTRs equal.  Forward flexion limited.  No ataxia.   Pre-Existing Condition(s):     Assessment:   Condition Improved    Status: Additional Care Required  Permanent Disability:No    Plan: Medication  Comments:Medrol    Diagnostics: X-ray  Comments:Lumbar series- NEG    Comments:       Disability Information   Status:      From:  2/5/2019  Through: 2/12/2019 Restrictions are: Temporary   Physical Restrictions   Sitting:    Standing:    Stooping:    Bending:      Squatting:    Walking:    Climbing:    Pushing:      Pulling:    Other:    Reaching Above Shoulder (L):   Reaching Above Shoulder (R):       Reaching Below Shoulder (L):    Reaching Below Shoulder (R):      Not to exceed Weight Limits   Carrying(hrs):   Weight Limit(lb):   Lifting(hrs):   Weight  Limit(lb):     Comments: Continue previous restrictions    Repetitive Actions   Hands: i.e. Fine  Manipulations from Grasping:     Feet: i.e. Operating Foot Controls:     Driving / Operate Machinery:     Physician Name: Teena Vaughn P.A.-C. Physician Signature: TEENA Mackenzie P.A.-C. e-Signature: Dr. Shamir Mckeon, Medical Director   Clinic Name / Location: [address]  [place] [address] Clinic Phone Number: Dept: [phone]   Appointment Time: 2:00 Pm Visit Start Time: 2:21 PM   Check-In Time:  2:03 Pm Visit Discharge Time: 3:54 PM   Original-Treating Physician or Chiropractor    Page 2-Insurer/TPA    Page 3-Employer    Page 4-Employee

## 2019-02-12 ENCOUNTER — OCCUPATIONAL MEDICINE (OUTPATIENT)
Dept: URGENT CARE | Facility: PHYSICIAN GROUP | Age: 24
End: 2019-02-12
Payer: COMMERCIAL

## 2019-02-12 VITALS
TEMPERATURE: 98.6 F | HEART RATE: 71 BPM | OXYGEN SATURATION: 98 % | HEIGHT: 71 IN | BODY MASS INDEX: 27.3 KG/M2 | SYSTOLIC BLOOD PRESSURE: 122 MMHG | DIASTOLIC BLOOD PRESSURE: 70 MMHG | WEIGHT: 195 LBS | RESPIRATION RATE: 16 BRPM

## 2019-02-12 DIAGNOSIS — S39.012D STRAIN OF LUMBAR REGION, SUBSEQUENT ENCOUNTER: ICD-10-CM

## 2019-02-12 PROCEDURE — 99213 OFFICE O/P EST LOW 20 MIN: CPT | Mod: 29 | Performed by: PHYSICIAN ASSISTANT

## 2019-02-12 ASSESSMENT — ENCOUNTER SYMPTOMS
TINGLING: 0
BACK PAIN: 1
SENSORY CHANGE: 0

## 2019-02-12 NOTE — PROGRESS NOTES
"Subjective:   Evens Hamilton is a 23 y.o. male who presents for Back Pain (WC/Fv )    Patient presents today with improving symptoms. He states that the previously prescribed steroids helped immensely with his pain. He states that he still has some tenderness and pain in his midline lower back, but feels like he has much more normal range of motion. He has been taking OTC ibuprofen prn as well as applying heat.  He is hoping for reduction of work restrictions today.      Review of Systems   Musculoskeletal: Positive for back pain.   Neurological: Negative for tingling and sensory change.       PMH:  has no past medical history on file.  MEDS:   Current Outpatient Prescriptions:   •  ibuprofen (MOTRIN) 200 MG Tab, Take 200 mg by mouth every 6 hours as needed., Disp: , Rfl:   •  MethylPREDNISolone (MEDROL DOSEPAK) 4 MG Tablet Therapy Pack, Medrol Dosepack, Use as directed, Disp: 21 Tab, Rfl: 0  •  naproxen (NAPROSYN) 500 MG TABS, Take 1 Tab by mouth 2 times a day, with meals., Disp: 21 Tab, Rfl: 0  ALLERGIES: No Known Allergies  SURGHX: History reviewed. No pertinent surgical history.  SOCHX:  reports that he quit smoking about 3 years ago. His smoking use included Cigarettes. He quit after 0.00 years of use. He has never used smokeless tobacco. He reports that he drinks alcohol. He reports that he does not use drugs.  FH: Reviewed with patient, not pertinent to this visit.     Objective:   /70   Pulse 71   Temp 37 °C (98.6 °F)   Resp 16   Ht 1.803 m (5' 11\")   Wt 88.5 kg (195 lb)   SpO2 98%   BMI 27.20 kg/m²   Physical Exam   Constitutional: He is oriented to person, place, and time. He appears well-developed and well-nourished.   HENT:   Head: Normocephalic and atraumatic.   Eyes: Conjunctivae and EOM are normal.   Neck: Normal range of motion. No tracheal deviation present.   Pulmonary/Chest: Effort normal. No respiratory distress.   Musculoskeletal:        Lumbar back: He exhibits " tenderness. He exhibits normal range of motion, no swelling, no edema, no deformity, no laceration and no spasm.        Back:    Tender to palpation at midline lower lumbar spine. Full ROM with mild pain at extremes of flexion and lateral bending.    Neurological: He is alert and oriented to person, place, and time. No sensory deficit.   Skin: Skin is warm and dry.   Psychiatric: He has a normal mood and affect. His behavior is normal. Judgment and thought content normal.       Assessment/Plan:   1. Strain of lumbar region, subsequent encounter    - Advised to continue OTC ibuprofen, heat/ice prn  - Work restrictions reduced  - Follow up in 1 week    Differential diagnosis, natural history, supportive care, and indications for immediate follow-up discussed.

## 2019-02-12 NOTE — LETTER
[facility]  [address] - [place] [address]  Phone:  [phone] - Fax:  [phone]   [facility] Progress Report and Disability Certification  Date of Service: 2/12/2019   No Show:  No  Date / Time of Next Visit: 2/19/2019   Claim Information   Patient Name: Evens Hamilton  Claim Number:     Employer: [employer]  Date of Injury: 1/31/2019     Insurer / TPA: [employer]  ID / SSN:     Occupation:  Lead  Diagnosis: The encounter diagnosis was Strain of lumbar region, subsequent encounter.    Medical Information   Related to Industrial Injury? Yes    Subjective Complaints:  Patient presents today with improving symptoms. He states that the previously prescribed steroids helped immensely with his pain. He states that he still has some tenderness and pain in his midline lower back, but feels like he has much more normal range of motion. He has been taking OTC ibuprofen prn as well as applying heat.  He is hoping for reduction of work restrictions today.    Objective Findings: Constitutional: He is oriented to person, place, and time. He appears well-developed and well-nourished.   HENT:   Head: Normocephalic and atraumatic.   Eyes: Conjunctivae and EOM are normal.   Neck: Normal range of motion. No tracheal deviation present.   Pulmonary/Chest: Effort normal. No respiratory distress.   Musculoskeletal:        Lumbar back: He exhibits tenderness. He exhibits normal range of motion, no swelling, no edema, no deformity, no laceration and no spasm.        Back:    Tender to palpation at midline lower lumbar spine. Full ROM with mild pain at extremes of flexion and lateral bending.    Neurological: He is alert and oriented to person, place, and time. No sensory deficit.   Skin: Skin is warm and dry.   Psychiatric: He has a normal mood and affect. His behavior is normal. Judgment and thought content normal.    Pre-Existing Condition(s):       Assessment:   Condition Improved    Status: Additional Care Required  Permanent Disability:No    Plan:   Comments:Continue OTC ibuprofen, heat/ice as needed    Diagnostics:      Comments:       Disability Information   Status: Released to Restricted Duty    From:  2/12/2019  Through: 2/19/2019 Restrictions are: Temporary   Physical Restrictions   Sitting:    Standing:    Stooping:  < or = to 2 hrs/day Bending:  < or = to 2 hrs/day   Squatting:    Walking:    Climbing:  < or = to 2 hrs/day Pushing:      Pulling:    Other:    Reaching Above Shoulder (L):   Reaching Above Shoulder (R):       Reaching Below Shoulder (L):    Reaching Below Shoulder (R):      Not to exceed Weight Limits   Carrying(hrs):   Weight Limit(lb): < or = to 25 pounds Lifting(hrs):   Weight  Limit(lb): < or = to 25 pounds   Comments:      Repetitive Actions   Hands: i.e. Fine Manipulations from Grasping:     Feet: i.e. Operating Foot Controls:     Driving / Operate Machinery:     Physician Name: Roland Bain P.A.-C. Physician Signature: ROLAND Owens P.A.-C. e-Signature: Dr. Shamir Mckeon, Medical Director   Clinic Name / Location: [address]  [place] [address] Clinic Phone Number: Dept: [phone]   Appointment Time: 2:00 Pm Visit Start Time: 2:01 PM   Check-In Time:  1:52 Pm Visit Discharge Time: 3:01 PM   Original-Treating Physician or Chiropractor    Page 2-Insurer/TPA    Page 3-Employer    Page 4-Employee

## 2019-02-19 ENCOUNTER — OCCUPATIONAL MEDICINE (OUTPATIENT)
Dept: URGENT CARE | Facility: PHYSICIAN GROUP | Age: 24
End: 2019-02-19
Payer: COMMERCIAL

## 2019-02-19 VITALS
SYSTOLIC BLOOD PRESSURE: 120 MMHG | BODY MASS INDEX: 27.3 KG/M2 | WEIGHT: 195 LBS | DIASTOLIC BLOOD PRESSURE: 76 MMHG | OXYGEN SATURATION: 97 % | RESPIRATION RATE: 14 BRPM | HEART RATE: 67 BPM | HEIGHT: 71 IN | TEMPERATURE: 97.8 F

## 2019-02-19 DIAGNOSIS — S39.012D STRAIN OF LUMBAR REGION, SUBSEQUENT ENCOUNTER: ICD-10-CM

## 2019-02-19 PROCEDURE — 99214 OFFICE O/P EST MOD 30 MIN: CPT | Mod: 29 | Performed by: NURSE PRACTITIONER

## 2019-02-19 ASSESSMENT — ENCOUNTER SYMPTOMS
MYALGIAS: 0
SORE THROAT: 0
SHORTNESS OF BREATH: 0
BACK PAIN: 1
EYE PAIN: 0
DIZZINESS: 0
CHILLS: 0
NAUSEA: 0
VOMITING: 0
FEVER: 0

## 2019-02-19 NOTE — LETTER
[facility]  [address] [place] [address]. [address] - [place] [address]  Phone:  [phone] - Fax:  [phone]   [facility] Progress Report and Disability Certification  Date of Service: 2/19/2019   No Show:  No  Date / Time of Next Visit: 2/25/2019@2:00PM   Claim Information   Patient Name: Evens Hamilton  Claim Number:     Employer: [employer]  Date of Injury: 1/31/2019     Insurer / TPA: Indira  ID / SSN:     Occupation:  Lead  Diagnosis: The encounter diagnosis was Strain of lumbar region, subsequent encounter.    Medical Information   Related to Industrial Injury? Yes    Subjective Complaints:  DOI: 1/31/19 around 5pm Patient is a  at [employer] and was loading carriages into a scissors lift. After lifting 5 or 6 carriages his back started hurting. 4th followPatient verbalizing improvement of symptoms however does still have slight exacerbating pain with movement and lifting.  Patient verbalizing at this time he does take anti-inflammatories which resolves the discomfort.  Intermittently he will have a shooting pain on the left side.  Denies numbness and tingling.  At this time patient has no pain sitting at rest.     Objective Findings: Spine- without midline tenderness, step-off or deformity. Without scoliosis or kyphosis. Without noted paraspinous spasm. FROM with lateral bend, and flexion/extension. Without noted tenderness over the sacroiliac notches. Sensation intact bilaterally, BLE motor 5/5 and symmetrical  strength. Negative Straight leg raise.  Gait- WNL without foot drop.      Pre-Existing Condition(s):     Assessment:   Condition Improved    Status: Additional Care Required  Permanent Disability:No    Plan:      Diagnostics:      Comments:       Disability Information   Status: Released to Full Duty    From:  2/19/2019  Through: 2/25/2019 Restrictions are:     Physical Restrictions   Sitting:    Standing:     Stooping:    Bending:      Squatting:    Walking:    Climbing:    Pushing:      Pulling:    Other:    Reaching Above Shoulder (L):   Reaching Above Shoulder (R):       Reaching Below Shoulder (L):    Reaching Below Shoulder (R):      Not to exceed Weight Limits   Carrying(hrs):   Weight Limit(lb):   Lifting(hrs):   Weight  Limit(lb):     Comments: Patient having improvement of symptoms.  Patient ready to trial full duty without restrictions.  Encouraged to continue with heat, gentle range of motion, NSAID therapy for pain and discomfort.  Will release patient to full duty without restrictions.  We will see patient back in 6 days.  If patient is improving and tolerating full duty anticipate discharge of care.    Repetitive Actions   Hands: i.e. Fine Manipulations from Grasping:     Feet: i.e. Operating Foot Controls:     Driving / Operate Machinery:     Physician Name: THIAGO Hall Physician Signature: PIA Benz e-Signature: Dr. Sahmir Mckeon, Medical Director   Clinic Name / Location: [address]  [place] [address] Clinic Phone Number: Dept: [phone]   Appointment Time: 2:20 Pm Visit Start Time: 2:38 PM   Check-In Time:  1:53 Pm Visit Discharge Time:  2:55PM   Original-Treating Physician or Chiropractor    Page 2-Insurer/TPA    Page 3-Employer    Page 4-Employee

## 2019-02-19 NOTE — PROGRESS NOTES
"Subjective:   Evens Hamilton is a 23 y.o. male who presents for Back Strain (WC/Fv)    DOI: 1/31/19 around 5pm Patient is a  at [employer] and was loading carriages into a scissors lift. After lifting 5 or 6 carriages his back started hurting. 4th followPatient verbalizing improvement of symptoms however does still have slight exacerbating pain with movement and lifting.  Patient verbalizing at this time he does take anti-inflammatories which resolves the discomfort.  Intermittently he will have a shooting pain on the left side.  Denies numbness and tingling.  At this time patient has no pain sitting at rest.     HPI  Review of Systems   Constitutional: Negative for chills and fever.   HENT: Negative for sore throat.    Eyes: Negative for pain.   Respiratory: Negative for shortness of breath.    Cardiovascular: Negative for chest pain.   Gastrointestinal: Negative for nausea and vomiting.   Genitourinary: Negative for hematuria.   Musculoskeletal: Positive for back pain. Negative for myalgias.   Skin: Negative for rash.   Neurological: Negative for dizziness.     No Known Allergies   Objective:   /76   Pulse 67   Temp 36.6 °C (97.8 °F)   Resp 14   Ht 1.803 m (5' 11\")   Wt 88.5 kg (195 lb)   SpO2 97%   BMI 27.20 kg/m²   Physical Exam   Constitutional: He is oriented to person, place, and time. He appears well-developed and well-nourished. No distress.   HENT:   Head: Normocephalic and atraumatic.   Eyes: Pupils are equal, round, and reactive to light. Conjunctivae and EOM are normal.   Cardiovascular: Normal rate and regular rhythm.    No murmur heard.  Pulmonary/Chest: Effort normal and breath sounds normal. No respiratory distress.   Abdominal: Soft. He exhibits no distension. There is no tenderness.   Musculoskeletal:        Lumbar back: He exhibits normal range of motion, no tenderness, no pain and no spasm.   Neurological: He is alert and oriented to person, place, and time. " He has normal reflexes. No sensory deficit.   Skin: Skin is warm and dry.   Psychiatric: He has a normal mood and affect.     Spine- without midline tenderness, step-off or deformity. Without scoliosis or kyphosis. Without noted paraspinous spasm. FROM with lateral bend, and flexion/extension. Without noted tenderness over the sacroiliac notches. Sensation intact bilaterally, BLE motor 5/5 and symmetrical  strength. Negative Straight leg raise.  Gait- WNL without foot drop.      Assessment/Plan:   1. Strain of lumbar region, subsequent encounter  Patient having improvement of symptoms.  Patient ready to trial full duty without restrictions.  Encouraged to continue with heat, gentle range of motion, NSAID therapy for pain and discomfort.  Will release patient to full duty without restrictions.  We will see patient back in 6 days.  If patient is improving and tolerating full duty anticipate discharge of care.  Differential diagnosis, natural history, supportive care, and indications for immediate follow-up discussed.

## 2019-02-25 ENCOUNTER — OCCUPATIONAL MEDICINE (OUTPATIENT)
Dept: URGENT CARE | Facility: PHYSICIAN GROUP | Age: 24
End: 2019-02-25
Payer: COMMERCIAL

## 2019-02-25 VITALS
HEART RATE: 74 BPM | SYSTOLIC BLOOD PRESSURE: 122 MMHG | RESPIRATION RATE: 16 BRPM | OXYGEN SATURATION: 98 % | BODY MASS INDEX: 27.3 KG/M2 | DIASTOLIC BLOOD PRESSURE: 70 MMHG | HEIGHT: 71 IN | TEMPERATURE: 98.9 F | WEIGHT: 195 LBS

## 2019-02-25 DIAGNOSIS — S39.012D STRAIN OF LUMBAR REGION, SUBSEQUENT ENCOUNTER: ICD-10-CM

## 2019-02-25 PROCEDURE — 99213 OFFICE O/P EST LOW 20 MIN: CPT | Mod: 29 | Performed by: PHYSICIAN ASSISTANT

## 2019-02-25 ASSESSMENT — ENCOUNTER SYMPTOMS
TINGLING: 0
SENSORY CHANGE: 0

## 2019-02-25 NOTE — PROGRESS NOTES
"Subjective:   Evens Hamilton is a 23 y.o. male who presents for Back Pain (lower back/ WC/fv )    Patient presents today stating resolution of symptoms. He states that he was back at full duty last week without issue. He states that he only had to take Ibuprofen once and he continues to apply ice as needed. He denies pain, limited ROM, numbness/tingling. He states that he is ready to be discharged.      Review of Systems   Musculoskeletal:        Positive for intermittent back pain   Neurological: Negative for tingling and sensory change.   All other systems reviewed and are negative.      PMH:  has no past medical history on file.  MEDS:   Current Outpatient Prescriptions:   •  ibuprofen (MOTRIN) 200 MG Tab, Take 200 mg by mouth every 6 hours as needed., Disp: , Rfl:   •  naproxen (NAPROSYN) 500 MG TABS, Take 1 Tab by mouth 2 times a day, with meals., Disp: 21 Tab, Rfl: 0  ALLERGIES: No Known Allergies  SURGHX: History reviewed. No pertinent surgical history.  SOCHX:  reports that he quit smoking about 3 years ago. His smoking use included Cigarettes. He quit after 0.00 years of use. He has never used smokeless tobacco. He reports that he drinks alcohol. He reports that he does not use drugs.  FH: Reviewed with patient, not pertinent to this visit.     Objective:   /70   Pulse 74   Temp 37.2 °C (98.9 °F)   Resp 16   Ht 1.803 m (5' 11\")   Wt 88.5 kg (195 lb)   SpO2 98%   BMI 27.20 kg/m²   Physical Exam   Constitutional: He is oriented to person, place, and time. He appears well-developed and well-nourished. No distress.   HENT:   Head: Normocephalic and atraumatic.   Nose: Nose normal.   Eyes: Conjunctivae and EOM are normal.   Neck: Normal range of motion. No tracheal deviation present.   Pulmonary/Chest: Effort normal. No respiratory distress.   Musculoskeletal:        Lumbar back: Normal. He exhibits normal range of motion, no tenderness, no bony tenderness, no swelling, no edema, no " deformity, no pain and no spasm.   Neurological: He is alert and oriented to person, place, and time.   Skin: Skin is warm and dry.   Psychiatric: He has a normal mood and affect. His behavior is normal. Judgment and thought content normal.       Assessment/Plan:   1. Strain of lumbar region, subsequent encounter    - Patient's symptoms have resolved  - Advised to continue OTC ibuprofen, ice as needed  - Released to full duty  - Discharged/MMI    Differential diagnosis, natural history, supportive care, and indications for immediate follow-up discussed.

## 2019-02-25 NOTE — LETTER
[facility]  [address] - [place] [address]  Phone:  [phone] - Fax:  [phone]   [facility] Progress Report and Disability Certification  Date of Service: 2/25/2019   No Show:  No  Date / Time of Next Visit:     Claim Information   Patient Name: Evens Hamilton  Claim Number:     Employer: [employer]  Date of Injury: 1/31/2019     Insurer / TPA: Indira  ID / SSN:     Occupation:  Lead  Diagnosis: The encounter diagnosis was Strain of lumbar region, subsequent encounter.    Medical Information   Related to Industrial Injury? Yes    Subjective Complaints:  Patient presents today stating resolution of symptoms. He states that he was back at full duty last week without issue. He states that he only had to take Ibuprofen once and he continues to apply ice as needed. He denies pain, limited ROM, numbness/tingling. He states that he is ready to be discharged.    Objective Findings: Constitutional: He is oriented to person, place, and time. He appears well-developed and well-nourished. No distress.   HENT:   Head: Normocephalic and atraumatic.   Nose: Nose normal.   Eyes: Conjunctivae and EOM are normal.   Neck: Normal range of motion. No tracheal deviation present.   Pulmonary/Chest: Effort normal. No respiratory distress.   Musculoskeletal:        Lumbar back: Normal. He exhibits normal range of motion, no tenderness, no bony tenderness, no swelling, no edema, no deformity, no pain and no spasm.   Neurological: He is alert and oriented to person, place, and time.   Skin: Skin is warm and dry.   Psychiatric: He has a normal mood and affect. His behavior is normal. Judgment and thought content normal.    Pre-Existing Condition(s):     Assessment:   Condition Improved    Status: Discharged /  MMI  Permanent Disability:No    Plan:   Comments:Continue OTC ibuprofen, ice as needed    Diagnostics:      Comments:       Disability  Information   Status: Released to Full Duty    From:     Through:   Restrictions are:     Physical Restrictions   Sitting:    Standing:    Stooping:    Bending:      Squatting:    Walking:    Climbing:    Pushing:      Pulling:    Other:    Reaching Above Shoulder (L):   Reaching Above Shoulder (R):       Reaching Below Shoulder (L):    Reaching Below Shoulder (R):      Not to exceed Weight Limits   Carrying(hrs):   Weight Limit(lb):   Lifting(hrs):   Weight  Limit(lb):     Comments:      Repetitive Actions   Hands: i.e. Fine Manipulations from Grasping:     Feet: i.e. Operating Foot Controls:     Driving / Operate Machinery:     Physician Name: Roland Bain P.A.-C. Physician Signature: ROLAND Owens P.A.-C. e-Signature: Dr. Shamir Mckeon, Medical Director   Clinic Name / Location: [address]  [place] [address] Clinic Phone Number: Dept: [phone]   Appointment Time: 2:00 Pm Visit Start Time: 2:36 PM   Check-In Time:  2:00 Pm Visit Discharge Time: 3:15 PM   Original-Treating Physician or Chiropractor    Page 2-Insurer/TPA    Page 3-Employer    Page 4-Employee

## 2019-05-23 ENCOUNTER — OFFICE VISIT (OUTPATIENT)
Dept: URGENT CARE | Facility: CLINIC | Age: 24
End: 2019-05-23

## 2019-05-23 ENCOUNTER — HOSPITAL ENCOUNTER (OUTPATIENT)
Facility: MEDICAL CENTER | Age: 24
End: 2019-05-23
Attending: PHYSICIAN ASSISTANT
Payer: COMMERCIAL

## 2019-05-23 VITALS
OXYGEN SATURATION: 96 % | WEIGHT: 194 LBS | RESPIRATION RATE: 14 BRPM | DIASTOLIC BLOOD PRESSURE: 88 MMHG | BODY MASS INDEX: 27.16 KG/M2 | SYSTOLIC BLOOD PRESSURE: 118 MMHG | HEART RATE: 74 BPM | HEIGHT: 71 IN | TEMPERATURE: 98 F

## 2019-05-23 DIAGNOSIS — Z02.1 PRE-EMPLOYMENT EXAMINATION: ICD-10-CM

## 2019-05-23 LAB
ALBUMIN SERPL BCP-MCNC: 4.7 G/DL (ref 3.2–4.9)
ALBUMIN/GLOB SERPL: 1.7 G/DL
ALP SERPL-CCNC: 77 U/L (ref 30–99)
ALT SERPL-CCNC: 52 U/L (ref 2–50)
ANION GAP SERPL CALC-SCNC: 7 MMOL/L (ref 0–11.9)
AST SERPL-CCNC: 32 U/L (ref 12–45)
BASOPHILS # BLD AUTO: 0.7 % (ref 0–1.8)
BASOPHILS # BLD: 0.04 K/UL (ref 0–0.12)
BILIRUB SERPL-MCNC: 0.4 MG/DL (ref 0.1–1.5)
BUN SERPL-MCNC: 14 MG/DL (ref 8–22)
CALCIUM SERPL-MCNC: 9.8 MG/DL (ref 8.5–10.5)
CHLORIDE SERPL-SCNC: 104 MMOL/L (ref 96–112)
CHOLEST SERPL-MCNC: 170 MG/DL (ref 100–199)
CO2 SERPL-SCNC: 29 MMOL/L (ref 20–33)
CREAT SERPL-MCNC: 0.99 MG/DL (ref 0.5–1.4)
EOSINOPHIL # BLD AUTO: 0.09 K/UL (ref 0–0.51)
EOSINOPHIL NFR BLD: 1.6 % (ref 0–6.9)
ERYTHROCYTE [DISTWIDTH] IN BLOOD BY AUTOMATED COUNT: 41.9 FL (ref 35.9–50)
GLOBULIN SER CALC-MCNC: 2.7 G/DL (ref 1.9–3.5)
GLUCOSE SERPL-MCNC: 102 MG/DL (ref 65–99)
HCT VFR BLD AUTO: 51.9 % (ref 42–52)
HDLC SERPL-MCNC: 55 MG/DL
HGB BLD-MCNC: 17.2 G/DL (ref 14–18)
IMM GRANULOCYTES # BLD AUTO: 0.02 K/UL (ref 0–0.11)
IMM GRANULOCYTES NFR BLD AUTO: 0.4 % (ref 0–0.9)
LDLC SERPL CALC-MCNC: 106 MG/DL
LYMPHOCYTES # BLD AUTO: 1.6 K/UL (ref 1–4.8)
LYMPHOCYTES NFR BLD: 29.2 % (ref 22–41)
MCH RBC QN AUTO: 31 PG (ref 27–33)
MCHC RBC AUTO-ENTMCNC: 33.1 G/DL (ref 33.7–35.3)
MCV RBC AUTO: 93.7 FL (ref 81.4–97.8)
MONOCYTES # BLD AUTO: 0.38 K/UL (ref 0–0.85)
MONOCYTES NFR BLD AUTO: 6.9 % (ref 0–13.4)
NEUTROPHILS # BLD AUTO: 3.35 K/UL (ref 1.82–7.42)
NEUTROPHILS NFR BLD: 61.2 % (ref 44–72)
NRBC # BLD AUTO: 0 K/UL
NRBC BLD-RTO: 0 /100 WBC
PLATELET # BLD AUTO: 253 K/UL (ref 164–446)
PMV BLD AUTO: 11.9 FL (ref 9–12.9)
POTASSIUM SERPL-SCNC: 4.7 MMOL/L (ref 3.6–5.5)
PROT SERPL-MCNC: 7.4 G/DL (ref 6–8.2)
RBC # BLD AUTO: 5.54 M/UL (ref 4.7–6.1)
SODIUM SERPL-SCNC: 140 MMOL/L (ref 135–145)
TRIGL SERPL-MCNC: 46 MG/DL (ref 0–149)
WBC # BLD AUTO: 5.5 K/UL (ref 4.8–10.8)

## 2019-05-23 PROCEDURE — 8912 PR VISION SCREENING: Performed by: PHYSICIAN ASSISTANT

## 2019-05-23 PROCEDURE — 99000 SPECIMEN HANDLING OFFICE-LAB: CPT | Performed by: PHYSICIAN ASSISTANT

## 2019-05-23 PROCEDURE — 80053 COMPREHEN METABOLIC PANEL: CPT | Performed by: PHYSICIAN ASSISTANT

## 2019-05-23 PROCEDURE — 94010 BREATHING CAPACITY TEST: CPT | Performed by: PHYSICIAN ASSISTANT

## 2019-05-23 PROCEDURE — 8915 PR COMPREHENSIVE PHYSICAL: Performed by: PHYSICIAN ASSISTANT

## 2019-05-23 PROCEDURE — 93000 ELECTROCARDIOGRAM COMPLETE: CPT | Performed by: PHYSICIAN ASSISTANT

## 2019-05-23 PROCEDURE — 92553 AUDIOMETRY AIR & BONE: CPT | Performed by: PHYSICIAN ASSISTANT

## 2019-05-23 PROCEDURE — 80061 LIPID PANEL: CPT | Performed by: PHYSICIAN ASSISTANT

## 2019-05-23 PROCEDURE — 85025 COMPLETE CBC W/AUTO DIFF WBC: CPT | Performed by: PHYSICIAN ASSISTANT

## 2019-06-24 ENCOUNTER — OFFICE VISIT (OUTPATIENT)
Dept: URGENT CARE | Facility: CLINIC | Age: 24
End: 2019-06-24

## 2019-06-24 ENCOUNTER — NON-PROVIDER VISIT (OUTPATIENT)
Dept: URGENT CARE | Facility: CLINIC | Age: 24
End: 2019-06-24

## 2019-06-24 DIAGNOSIS — Z01.89 RESPIRATORY CLEARANCE EXAMINATION, ENCOUNTER FOR: ICD-10-CM

## 2019-06-24 PROCEDURE — 8916 PR CLEARANCE ONLY: Performed by: PHYSICIAN ASSISTANT

## 2019-06-24 PROCEDURE — 94375 RESPIRATORY FLOW VOLUME LOOP: CPT | Performed by: PHYSICIAN ASSISTANT

## 2019-06-24 NOTE — PROGRESS NOTES
Evens Hamilton is a 24 y.o. male here for a non-provider visit for Mask Fit    If abnormal was an in office provider notified today (if so, indicate provider)? Yes  Routed to PCP? No

## 2019-06-24 NOTE — PROGRESS NOTES
Evens Hamilton is a 24 y.o. male here for a non-provider visit for Resp. Clearance    If abnormal was an in office provider notified today (if so, indicate provider)? Yes  Routed to PCP? No

## 2019-07-28 ENCOUNTER — HOSPITAL ENCOUNTER (EMERGENCY)
Facility: MEDICAL CENTER | Age: 24
End: 2019-07-29
Attending: EMERGENCY MEDICINE
Payer: COMMERCIAL

## 2019-07-28 ENCOUNTER — APPOINTMENT (OUTPATIENT)
Dept: RADIOLOGY | Facility: MEDICAL CENTER | Age: 24
End: 2019-07-28
Attending: EMERGENCY MEDICINE
Payer: COMMERCIAL

## 2019-07-28 DIAGNOSIS — K59.00 CONSTIPATION, UNSPECIFIED CONSTIPATION TYPE: ICD-10-CM

## 2019-07-28 DIAGNOSIS — R10.11 RIGHT UPPER QUADRANT ABDOMINAL PAIN: ICD-10-CM

## 2019-07-28 LAB
ALBUMIN SERPL BCP-MCNC: 4.3 G/DL (ref 3.2–4.9)
ALBUMIN/GLOB SERPL: 1.5 G/DL
ALP SERPL-CCNC: 72 U/L (ref 30–99)
ALT SERPL-CCNC: 27 U/L (ref 2–50)
ANION GAP SERPL CALC-SCNC: 8 MMOL/L (ref 0–11.9)
APPEARANCE UR: CLEAR
AST SERPL-CCNC: 18 U/L (ref 12–45)
BASOPHILS # BLD AUTO: 0.4 % (ref 0–1.8)
BASOPHILS # BLD: 0.04 K/UL (ref 0–0.12)
BILIRUB SERPL-MCNC: 0.7 MG/DL (ref 0.1–1.5)
BILIRUB UR QL STRIP.AUTO: NEGATIVE
BUN SERPL-MCNC: 15 MG/DL (ref 8–22)
CALCIUM SERPL-MCNC: 9.3 MG/DL (ref 8.4–10.2)
CHLORIDE SERPL-SCNC: 104 MMOL/L (ref 96–112)
CO2 SERPL-SCNC: 27 MMOL/L (ref 20–33)
COLOR UR: YELLOW
CREAT SERPL-MCNC: 1.07 MG/DL (ref 0.5–1.4)
EOSINOPHIL # BLD AUTO: 0.09 K/UL (ref 0–0.51)
EOSINOPHIL NFR BLD: 0.9 % (ref 0–6.9)
ERYTHROCYTE [DISTWIDTH] IN BLOOD BY AUTOMATED COUNT: 40 FL (ref 35.9–50)
GLOBULIN SER CALC-MCNC: 2.9 G/DL (ref 1.9–3.5)
GLUCOSE SERPL-MCNC: 103 MG/DL (ref 65–99)
GLUCOSE UR STRIP.AUTO-MCNC: NEGATIVE MG/DL
HCT VFR BLD AUTO: 45.2 % (ref 42–52)
HGB BLD-MCNC: 15.3 G/DL (ref 14–18)
IMM GRANULOCYTES # BLD AUTO: 0.03 K/UL (ref 0–0.11)
IMM GRANULOCYTES NFR BLD AUTO: 0.3 % (ref 0–0.9)
KETONES UR STRIP.AUTO-MCNC: NEGATIVE MG/DL
LEUKOCYTE ESTERASE UR QL STRIP.AUTO: NEGATIVE
LIPASE SERPL-CCNC: 28 U/L (ref 7–58)
LYMPHOCYTES # BLD AUTO: 2.2 K/UL (ref 1–4.8)
LYMPHOCYTES NFR BLD: 21.7 % (ref 22–41)
MCH RBC QN AUTO: 30.4 PG (ref 27–33)
MCHC RBC AUTO-ENTMCNC: 33.8 G/DL (ref 33.7–35.3)
MCV RBC AUTO: 89.9 FL (ref 81.4–97.8)
MICRO URNS: NORMAL
MONOCYTES # BLD AUTO: 0.61 K/UL (ref 0–0.85)
MONOCYTES NFR BLD AUTO: 6 % (ref 0–13.4)
NEUTROPHILS # BLD AUTO: 7.19 K/UL (ref 1.82–7.42)
NEUTROPHILS NFR BLD: 70.7 % (ref 44–72)
NITRITE UR QL STRIP.AUTO: NEGATIVE
NRBC # BLD AUTO: 0 K/UL
NRBC BLD-RTO: 0 /100 WBC
PH UR STRIP.AUTO: 5.5 [PH]
PLATELET # BLD AUTO: 252 K/UL (ref 164–446)
PMV BLD AUTO: 10.6 FL (ref 9–12.9)
POTASSIUM SERPL-SCNC: 3.6 MMOL/L (ref 3.6–5.5)
PROT SERPL-MCNC: 7.2 G/DL (ref 6–8.2)
PROT UR QL STRIP: NEGATIVE MG/DL
RBC # BLD AUTO: 5.03 M/UL (ref 4.7–6.1)
RBC UR QL AUTO: NEGATIVE
SODIUM SERPL-SCNC: 139 MMOL/L (ref 135–145)
SP GR UR STRIP.AUTO: 1.02
WBC # BLD AUTO: 10.2 K/UL (ref 4.8–10.8)

## 2019-07-28 PROCEDURE — 76705 ECHO EXAM OF ABDOMEN: CPT

## 2019-07-28 PROCEDURE — 80053 COMPREHEN METABOLIC PANEL: CPT

## 2019-07-28 PROCEDURE — 83690 ASSAY OF LIPASE: CPT

## 2019-07-28 PROCEDURE — 96374 THER/PROPH/DIAG INJ IV PUSH: CPT

## 2019-07-28 PROCEDURE — 81003 URINALYSIS AUTO W/O SCOPE: CPT

## 2019-07-28 PROCEDURE — A9270 NON-COVERED ITEM OR SERVICE: HCPCS | Performed by: EMERGENCY MEDICINE

## 2019-07-28 PROCEDURE — 74018 RADEX ABDOMEN 1 VIEW: CPT

## 2019-07-28 PROCEDURE — 85025 COMPLETE CBC W/AUTO DIFF WBC: CPT

## 2019-07-28 PROCEDURE — 700111 HCHG RX REV CODE 636 W/ 250 OVERRIDE (IP): Performed by: EMERGENCY MEDICINE

## 2019-07-28 PROCEDURE — 99285 EMERGENCY DEPT VISIT HI MDM: CPT

## 2019-07-28 PROCEDURE — 700102 HCHG RX REV CODE 250 W/ 637 OVERRIDE(OP): Performed by: EMERGENCY MEDICINE

## 2019-07-28 RX ORDER — KETOROLAC TROMETHAMINE 30 MG/ML
30 INJECTION, SOLUTION INTRAMUSCULAR; INTRAVENOUS ONCE
Status: COMPLETED | OUTPATIENT
Start: 2019-07-28 | End: 2019-07-28

## 2019-07-28 RX ORDER — BISACODYL 5 MG
10 TABLET, DELAYED RELEASE (ENTERIC COATED) ORAL ONCE
Status: COMPLETED | OUTPATIENT
Start: 2019-07-29 | End: 2019-07-28

## 2019-07-28 RX ADMIN — KETOROLAC TROMETHAMINE 30 MG: 30 INJECTION, SOLUTION INTRAMUSCULAR at 22:58

## 2019-07-28 RX ADMIN — BISACODYL 10 MG: 5 TABLET, COATED ORAL at 23:39

## 2019-07-29 VITALS
RESPIRATION RATE: 16 BRPM | DIASTOLIC BLOOD PRESSURE: 96 MMHG | SYSTOLIC BLOOD PRESSURE: 134 MMHG | BODY MASS INDEX: 25.99 KG/M2 | HEART RATE: 62 BPM | OXYGEN SATURATION: 95 % | WEIGHT: 185.63 LBS | TEMPERATURE: 98.4 F | HEIGHT: 71 IN

## 2019-07-29 NOTE — ED NOTES
Pt given discharge instructions including to use a laxative prn and eat more fiber.  Pt advised to follow up with Dr. Singh in 1 week and return if worse.; verbalized understanding of instructions.  Ambulated out with family member.

## 2019-07-29 NOTE — DISCHARGE INSTRUCTIONS
Increase water and fiber in your diet i.e. bran cereal, bran muffins, raw vegetables i.e. celery and broccoli.  You may need to take a laxative for 1 more day if you do not have a good bowel movement and the pain goes away.  Follow-up with your primary care doctor in 1 week for recheck if not improved.

## 2019-07-29 NOTE — ED TRIAGE NOTES
"Chief Complaint   Patient presents with   • Abdominal Pain     pt c/o RUQ pain since this morning increasing throughout the day     /96   Pulse 69   Temp 36.9 °C (98.4 °F) (Oral)   Resp 18   Ht 1.803 m (5' 11\")   Wt 84.2 kg (185 lb 10 oz)   SpO2 98%   BMI 25.89 kg/m²     Pt ambulates to triage with stable gait, alert and oriented, regular unlabored respirations.    "

## 2019-07-29 NOTE — ED PROVIDER NOTES
"CHIEF COMPLAINT  Chief Complaint   Patient presents with   • Abdominal Pain     pt c/o RUQ pain since this morning increasing throughout the day       HPI  Evens Guajardo is a 24 y.o. male who presents tonight with his girlfriend with a chief complaint of right upper quadrant abdominal pain since this morning.  He states he ate macaroni and cheese and steak last night for dinner.  He awakened this morning with severe doubling over right upper quadrant abdominal pain.  He was nauseous but then as the day progressed it got better.  He had tried hip and strip scampi for dinner tonight and not long after that began having the pain again.  He states it is relentless.  He describes nausea without vomiting.  He has no flank tenderness, no hematuria or dysuria.  He has had no fever or chills.    REVIEW OF SYSTEMS  See HPI for further details. All other systems are negative.    PAST MEDICAL HISTORY  History reviewed. No pertinent past medical history.    FAMILY HISTORY  History reviewed. No pertinent family history.    SOCIAL HISTORY  Social History     Social History   • Marital status: Single     Spouse name: N/A   • Number of children: N/A   • Years of education: N/A     Social History Main Topics   • Smoking status: Former Smoker     Years: 0.00     Types: Cigarettes     Quit date: 5/6/2015   • Smokeless tobacco: Never Used   • Alcohol use No   • Drug use: No      Comment: denies drug use   • Sexual activity: Not on file     Other Topics Concern   • Not on file     Social History Narrative   • No narrative on file       SURGICAL HISTORY  History reviewed. No pertinent surgical history.    CURRENT MEDICATIONS  none    ALLERGIES  No Known Allergies    PHYSICAL EXAM  VITAL SIGNS: /96   Pulse 69   Temp 36.9 °C (98.4 °F) (Oral)   Resp 18   Ht 1.803 m (5' 11\")   Wt 84.2 kg (185 lb 10 oz)   SpO2 98%   BMI 25.89 kg/m²     Constitutional: Patient is well developed, well nourished in mild distress " secondary to his abdominal pain.  HENT: Normocephalic, Oropharynx moist , nose normal.   Eyes: PERRL, EOMI, Conjunctiva without erythema.  Neck: Supple with  Normal range of motion in flexion, extension and lateral rotation. No tenderness along the bony prominences or paraspinal muscles.   Cardiovascular: Normal heart rate and rhythm. No murmur.  Thorax & Lungs: Clear and equal breath sounds with good excursion. No respiratory distress.  Abdomen: Bowel sounds normal in all four quadrants. Soft with significant right upper quadrant tenderness upon palpation there is no flank tenderness no masses.  Skin: Warm, Dry, No rashes.   Back: No cervical, thoracic, or lumbosacral tenderness.  Extremities: Peripheral pulses 4/4, No tenderness   Musculoskeletal: Normal range of motion in all major joints.   Neurologic: Alert & oriented x 3, Normal motor function, Normal sensory function  Psychiatric: Affect normal, Judgment normal, Mood normal.     RADIOLOGY/PROCEDURES  US-RUQ   Final Result      No significant abnormality.      [id number] VIEW   Final Result      No dilated loops of bowel or free air.            COURSE & MEDICAL DECISION MAKING  Pertinent Labs & Imaging studies reviewed. (See chart for details)  Patient received a Hep-Lock IV with Toradol 30 mg IV push.  Laboratories were drawn, RUQ ultrasound was performed as well as a KUB.  Ultrasound was unremarkable for any biliary disease.  KUB shows increased stool in the right upper quadrant consistent with constipation with no signs of obstruction.  Laboratories are all unremarkable with normal LFTs and a normal white blood cell count.  He is improved upon recheck and will be discharged home to increase fluids, high-fiber diet.  He is to rest return if problems otherwise see his doctor in 1 week for recheck.  He is stable upon discharge.    FINAL IMPRESSION  1.  Right upper quadrant abdominal pain  2.  Constipation  3.         Electronically signed by: Monica Koch  7/28/2019 10:45 PMED Provider Note

## 2023-02-11 ENCOUNTER — OFFICE VISIT (OUTPATIENT)
Dept: URGENT CARE | Facility: PHYSICIAN GROUP | Age: 28
End: 2023-02-11
Payer: COMMERCIAL

## 2023-02-11 VITALS
SYSTOLIC BLOOD PRESSURE: 122 MMHG | WEIGHT: 172.8 LBS | TEMPERATURE: 97.5 F | OXYGEN SATURATION: 100 % | HEART RATE: 60 BPM | HEIGHT: 71 IN | DIASTOLIC BLOOD PRESSURE: 78 MMHG | BODY MASS INDEX: 24.19 KG/M2

## 2023-02-11 DIAGNOSIS — J02.9 PHARYNGITIS, UNSPECIFIED ETIOLOGY: ICD-10-CM

## 2023-02-11 DIAGNOSIS — Z20.818 EXPOSURE TO STREP THROAT: ICD-10-CM

## 2023-02-11 PROCEDURE — 99203 OFFICE O/P NEW LOW 30 MIN: CPT

## 2023-02-11 RX ORDER — AMOXICILLIN 500 MG/1
500 CAPSULE ORAL 2 TIMES DAILY
Qty: 20 CAPSULE | Refills: 0 | Status: SHIPPED | OUTPATIENT
Start: 2023-02-11 | End: 2023-02-21

## 2023-02-11 ASSESSMENT — PAIN SCALES - GENERAL: PAINLEVEL: 3=SLIGHT PAIN

## 2023-02-12 NOTE — PROGRESS NOTES
"Subjective:   Evens Guajardo is a 27 y.o. male who presents for Pharyngitis (Per pt, his kids have Strep) and Headache      HPI: This is a 27-year-old male who presents today for evaluation of sore throat and strep exposure.  Patient reports developing headache and sore throat this morning.  He has taken Aleve without any improvement in his symptoms.  He reports pain is moderate.  He has recent close strep exposure from 2 of his sons.  He reports that 2 sons recently tested positive and are being treated for strep throat.  He denies fevers, chills, body aches.    ROS per HPI    Medications:    No current outpatient medications on file prior to visit.     No current facility-administered medications on file prior to visit.        Allergies:   Patient has no known allergies.    Problem List:   There is no problem list on file for this patient.       Surgical History:  No past surgical history on file.    Past Social Hx:   Social History     Tobacco Use    Smoking status: Former     Years: 0.00     Types: Cigarettes     Quit date: 2015     Years since quittin.7    Smokeless tobacco: Never   Substance Use Topics    Alcohol use: No    Drug use: No     Types: Inhaled     Comment: denies drug use          Problem list, medications, and allergies reviewed by myself today in Epic.     Objective:     /78 (BP Location: Left arm, Patient Position: Sitting, BP Cuff Size: Adult)   Pulse 60   Temp 36.4 °C (97.5 °F) (Temporal)   Ht 1.803 m (5' 11\")   Wt 78.4 kg (172 lb 12.8 oz)   SpO2 100%   BMI 24.10 kg/m²     Physical Exam  Vitals and nursing note reviewed.   Constitutional:       General: He is not in acute distress.     Appearance: Normal appearance. He is normal weight. He is not ill-appearing, toxic-appearing or diaphoretic.   HENT:      Head: Normocephalic and atraumatic.      Right Ear: Tympanic membrane, ear canal and external ear normal. There is no impacted cerumen.      Left Ear: " Tympanic membrane, ear canal and external ear normal. There is no impacted cerumen.      Nose: Nose normal. No congestion or rhinorrhea.      Mouth/Throat:      Mouth: Mucous membranes are moist.      Pharynx: Oropharynx is clear. Uvula midline. Posterior oropharyngeal erythema present. No oropharyngeal exudate.      Tonsils: No tonsillar exudate.   Cardiovascular:      Rate and Rhythm: Normal rate and regular rhythm.      Pulses: Normal pulses.      Heart sounds: Normal heart sounds. No murmur heard.    No friction rub. No gallop.   Pulmonary:      Effort: Pulmonary effort is normal. No respiratory distress.      Breath sounds: Normal breath sounds. No stridor. No wheezing, rhonchi or rales.   Chest:      Chest wall: No tenderness.   Musculoskeletal:      Cervical back: Normal range of motion and neck supple. No tenderness.   Lymphadenopathy:      Cervical: No cervical adenopathy.   Skin:     General: Skin is warm and dry.      Capillary Refill: Capillary refill takes less than 2 seconds.   Neurological:      General: No focal deficit present.      Mental Status: He is alert and oriented to person, place, and time. Mental status is at baseline.   Psychiatric:         Mood and Affect: Mood normal.         Behavior: Behavior normal.         Thought Content: Thought content normal.         Judgment: Judgment normal.       Assessment/Plan:     Diagnosis and associated orders:   1. Exposure to strep throat  amoxicillin (AMOXIL) 500 MG Cap      2. Pharyngitis, unspecified etiology  amoxicillin (AMOXIL) 500 MG Cap             Comments/MDM:   Pt is clinically stable at today's acute urgent care visit.  No acute distress noted. Appropriate for outpatient management at this time.     Acute problem  Amoxicillin as prescribed  Alternate Tylenol ibuprofen as needed for pain  Warm salt water gargles           Discussed DDx, management options (risks,benefits, and alternatives to planned treatment), natural progression and  supportive care.  Expressed understanding and the treatment plan was agreed upon. Questions were encouraged and answered   Return to urgent care prn if new or worsening sx or if there is no improvement in condition prn.    Educated in Red flags and indications to immediately call 911 or present to the Emergency Department.   Advised the patient to follow-up with the primary care physician for recheck, reevaluation, and consideration of further management.    I personally reviewed prior external notes and test results pertinent to today's visit.  I have independently reviewed and interpreted all diagnostics ordered during this urgent care acute visit.       Please note that this dictation was created using voice recognition software. I have made a reasonable attempt to correct obvious errors, but I expect that there are errors of grammar and possibly content that I did not discover before finalizing the note.    This note was electronically signed by JAIRON Syed

## 2023-02-15 ENCOUNTER — OFFICE VISIT (OUTPATIENT)
Dept: URGENT CARE | Facility: PHYSICIAN GROUP | Age: 28
End: 2023-02-15
Payer: COMMERCIAL

## 2023-02-15 VITALS
HEIGHT: 71 IN | TEMPERATURE: 98.6 F | OXYGEN SATURATION: 99 % | SYSTOLIC BLOOD PRESSURE: 112 MMHG | HEART RATE: 80 BPM | DIASTOLIC BLOOD PRESSURE: 78 MMHG | WEIGHT: 172 LBS | BODY MASS INDEX: 24.08 KG/M2 | RESPIRATION RATE: 14 BRPM

## 2023-02-15 DIAGNOSIS — S39.012A STRAIN OF LUMBAR PARASPINAL MUSCLE, INITIAL ENCOUNTER: ICD-10-CM

## 2023-02-15 DIAGNOSIS — B08.5 HERPANGINA: ICD-10-CM

## 2023-02-15 LAB
APPEARANCE UR: CLEAR
BILIRUB UR STRIP-MCNC: NEGATIVE MG/DL
COLOR UR AUTO: YELLOW
GLUCOSE UR STRIP.AUTO-MCNC: NEGATIVE MG/DL
KETONES UR STRIP.AUTO-MCNC: NEGATIVE MG/DL
LEUKOCYTE ESTERASE UR QL STRIP.AUTO: NEGATIVE
NITRITE UR QL STRIP.AUTO: NEGATIVE
PH UR STRIP.AUTO: 6 [PH] (ref 5–8)
PROT UR QL STRIP: NEGATIVE MG/DL
RBC UR QL AUTO: NEGATIVE
SP GR UR STRIP.AUTO: 1.02
UROBILINOGEN UR STRIP-MCNC: 0.2 MG/DL

## 2023-02-15 PROCEDURE — 81002 URINALYSIS NONAUTO W/O SCOPE: CPT | Performed by: STUDENT IN AN ORGANIZED HEALTH CARE EDUCATION/TRAINING PROGRAM

## 2023-02-15 PROCEDURE — 99213 OFFICE O/P EST LOW 20 MIN: CPT | Performed by: STUDENT IN AN ORGANIZED HEALTH CARE EDUCATION/TRAINING PROGRAM

## 2023-02-15 RX ORDER — LIDOCAINE 4 G/G
PATCH TOPICAL
Qty: 15 PATCH | Refills: 0 | Status: SHIPPED | OUTPATIENT
Start: 2023-02-15

## 2023-02-15 NOTE — PROGRESS NOTES
Subjective:   CHIEF COMPLAINT  Chief Complaint   Patient presents with    Low Back Pain     X 2 days  , no injury     Pharyngitis     X 1 week , still not feeling better        HPI  Evens Guajardo is a 27 y.o. male who presents with chief complaint of bilateral low back pain.  Symptoms started 2 days ago.  No specific trauma or injury to explain symptoms.  Symptoms are aggravated when getting up from a seated position and with bending over.  Ibuprofen Aleve have helped.  Described as sharp, stabbing discomfort that does not radiate.  No radiculopathy into the legs.  No spontaneous loss of bowels or bladder.  No saddle anesthesia.  No dysuria or hematuria.     Patient also here complaining of sore throat.  He was seen in urgent care 4 days ago after exposure to strep throat and started on amoxicillin.  Says antibiotics have not helped.    REVIEW OF SYSTEMS  General: no fever or chills  GI: no nausea or vomiting  See HPI for further details.    PAST MEDICAL HISTORY       SURGICAL HISTORY  patient denies any surgical history    ALLERGIES  No Known Allergies    CURRENT MEDICATIONS  Home Medications       Reviewed by Luigi Colin D.O. (Physician) on 23 at 1755  Med List Status: <None>     Medication Last Dose Status   amoxicillin (AMOXIL) 500 MG Cap Taking Active   Lidocaine 4 % Patch  Active                    SOCIAL HISTORY  Social History     Tobacco Use    Smoking status: Former     Years: 0.00     Types: Cigarettes     Quit date: 2015     Years since quittin.7    Smokeless tobacco: Never   Substance and Sexual Activity    Alcohol use: No    Drug use: No     Types: Inhaled     Comment: denies drug use    Sexual activity: Not on file       FAMILY HISTORY  No family history on file.       Objective:   PHYSICAL EXAM  VITAL SIGNS: /78 (BP Location: Right arm, Patient Position: Sitting, BP Cuff Size: Adult)   Pulse 80   Temp 37 °C (98.6 °F) (Temporal)   Resp 14   Ht 1.803 m  "(5' 11\")   Wt 78 kg (172 lb)   SpO2 99%   BMI 23.99 kg/m²     Gen: no acute distress, normal voice  Psych: normal affect, normal judgement, alert, awake  Skin: dry, intact, moist mucosal membranes  ENT: Large, isolated ulcer along the left anterior tonsillar pillar.  Minimal surrounding oropharyngeal erythema.  No exudates.  Uvula midline.  No lymphadenopathy.  Lungs: CTAB w/ symmetric expansion  CV: RRR w/o murmurs or clicks  Abdomen: Soft, no distention.  No TTP, rebound or involuntary guarding.  MSK: Lumbar spine: No erythema, ecchymosis or edema.  Full range of motion associated with mild discernible discomfort with terminal flexion and extension.  TTP along the bilateral paraspinal muscles; left greater than right.  No midline TTP, step-off or crepitus.  No CVAT bilaterally      UA: No leukocytes, blood, nitrates or glucose.    Assessment/Plan:     1. Strain of lumbar paraspinal muscle, initial encounter  Lidocaine 4 % Patch    POCT Urinalysis      2. Herpangina        1) History and physical exam are consistent with muscle strain.  Exam was reassuring.  No red flags.  - Ordered Rx for topical lidocaine patch  - Recommended Tylenol as needed for symptomatic relief  - Discontinue NSAIDs  - Recommended light activity as tolerated  - Return to urgent care any new/worsening symptoms or further questions or concerns.  Patient understood everything discussed.  All questions were answered.    2) examination consistent with viral etiology.  He was started on amoxicillin due to exposure strep throat but POC test was not performed; uncertain if there is underlying strep infection but at this point it would be moot to performing a throat culture so instructed to continue taking amoxicillin through completion.  Also recommended Tylenol and gargling salt water.      Differential diagnosis, natural history, supportive care, and indications for immediate follow-up discussed. All questions answered. Patient agrees with the " plan of care.    Follow-up as needed if symptoms worsen or fail to improve to PCP, Urgent care or Emergency Room.    Please note that this dictation was created using voice recognition software. I have made a reasonable attempt to correct obvious errors, but I expect that there are errors of grammar and possibly content that I did not discover before finalizing the note.

## 2023-07-01 NOTE — ED NOTES
Released with all follow-up, pt advised to see Occupational Health tomorrow for recheck. Pt understands to use Ointment three times daily for th next 3 days per ERP recommendation. (entry made by Monica CONTRERAS)   No

## 2024-03-09 ENCOUNTER — HOSPITAL ENCOUNTER (OUTPATIENT)
Dept: LAB | Facility: MEDICAL CENTER | Age: 29
End: 2024-03-09
Attending: FAMILY MEDICINE
Payer: COMMERCIAL

## 2024-03-09 LAB
ALBUMIN SERPL BCP-MCNC: 4.5 G/DL (ref 3.2–4.9)
ALBUMIN/GLOB SERPL: 1.8 G/DL
ALP SERPL-CCNC: 61 U/L (ref 30–99)
ALT SERPL-CCNC: 41 U/L (ref 2–50)
ANION GAP SERPL CALC-SCNC: 12 MMOL/L (ref 7–16)
APPEARANCE UR: CLEAR
AST SERPL-CCNC: 23 U/L (ref 12–45)
BASOPHILS # BLD AUTO: 0.6 % (ref 0–1.8)
BASOPHILS # BLD: 0.03 K/UL (ref 0–0.12)
BILIRUB SERPL-MCNC: 0.5 MG/DL (ref 0.1–1.5)
BILIRUB UR QL STRIP.AUTO: NEGATIVE
BUN SERPL-MCNC: 14 MG/DL (ref 8–22)
CALCIUM ALBUM COR SERPL-MCNC: 8.5 MG/DL (ref 8.5–10.5)
CALCIUM SERPL-MCNC: 8.9 MG/DL (ref 8.5–10.5)
CHLORIDE SERPL-SCNC: 105 MMOL/L (ref 96–112)
CHOLEST SERPL-MCNC: 192 MG/DL (ref 100–199)
CO2 SERPL-SCNC: 24 MMOL/L (ref 20–33)
COLOR UR: YELLOW
CREAT SERPL-MCNC: 0.96 MG/DL (ref 0.5–1.4)
EOSINOPHIL # BLD AUTO: 0.09 K/UL (ref 0–0.51)
EOSINOPHIL NFR BLD: 1.8 % (ref 0–6.9)
ERYTHROCYTE [DISTWIDTH] IN BLOOD BY AUTOMATED COUNT: 42.3 FL (ref 35.9–50)
EST. AVERAGE GLUCOSE BLD GHB EST-MCNC: 108 MG/DL
FASTING STATUS PATIENT QL REPORTED: NORMAL
GFR SERPLBLD CREATININE-BSD FMLA CKD-EPI: 111 ML/MIN/1.73 M 2
GLOBULIN SER CALC-MCNC: 2.5 G/DL (ref 1.9–3.5)
GLUCOSE SERPL-MCNC: 94 MG/DL (ref 65–99)
GLUCOSE UR STRIP.AUTO-MCNC: NEGATIVE MG/DL
HBA1C MFR BLD: 5.4 % (ref 4–5.6)
HCT VFR BLD AUTO: 47 % (ref 42–52)
HDLC SERPL-MCNC: 60 MG/DL
HGB BLD-MCNC: 15.7 G/DL (ref 14–18)
IMM GRANULOCYTES # BLD AUTO: 0.02 K/UL (ref 0–0.11)
IMM GRANULOCYTES NFR BLD AUTO: 0.4 % (ref 0–0.9)
KETONES UR STRIP.AUTO-MCNC: NEGATIVE MG/DL
LDLC SERPL CALC-MCNC: 118 MG/DL
LEUKOCYTE ESTERASE UR QL STRIP.AUTO: NEGATIVE
LYMPHOCYTES # BLD AUTO: 1.71 K/UL (ref 1–4.8)
LYMPHOCYTES NFR BLD: 33.5 % (ref 22–41)
MCH RBC QN AUTO: 30.4 PG (ref 27–33)
MCHC RBC AUTO-ENTMCNC: 33.4 G/DL (ref 32.3–36.5)
MCV RBC AUTO: 91.1 FL (ref 81.4–97.8)
MICRO URNS: NORMAL
MONOCYTES # BLD AUTO: 0.31 K/UL (ref 0–0.85)
MONOCYTES NFR BLD AUTO: 6.1 % (ref 0–13.4)
NEUTROPHILS # BLD AUTO: 2.95 K/UL (ref 1.82–7.42)
NEUTROPHILS NFR BLD: 57.6 % (ref 44–72)
NITRITE UR QL STRIP.AUTO: NEGATIVE
NRBC # BLD AUTO: 0 K/UL
NRBC BLD-RTO: 0 /100 WBC (ref 0–0.2)
PH UR STRIP.AUTO: 6 [PH] (ref 5–8)
PLATELET # BLD AUTO: 286 K/UL (ref 164–446)
PMV BLD AUTO: 11.5 FL (ref 9–12.9)
POTASSIUM SERPL-SCNC: 4.8 MMOL/L (ref 3.6–5.5)
PROT SERPL-MCNC: 7 G/DL (ref 6–8.2)
PROT UR QL STRIP: NEGATIVE MG/DL
RBC # BLD AUTO: 5.16 M/UL (ref 4.7–6.1)
RBC UR QL AUTO: NEGATIVE
SODIUM SERPL-SCNC: 141 MMOL/L (ref 135–145)
SP GR UR STRIP.AUTO: 1.02
TRIGL SERPL-MCNC: 72 MG/DL (ref 0–149)
UROBILINOGEN UR STRIP.AUTO-MCNC: 0.2 MG/DL
WBC # BLD AUTO: 5.1 K/UL (ref 4.8–10.8)

## 2024-03-09 PROCEDURE — 36415 COLL VENOUS BLD VENIPUNCTURE: CPT

## 2024-03-09 PROCEDURE — 80061 LIPID PANEL: CPT

## 2024-03-09 PROCEDURE — 83036 HEMOGLOBIN GLYCOSYLATED A1C: CPT

## 2024-03-09 PROCEDURE — 85025 COMPLETE CBC W/AUTO DIFF WBC: CPT

## 2024-03-09 PROCEDURE — 81003 URINALYSIS AUTO W/O SCOPE: CPT

## 2024-03-09 PROCEDURE — 80053 COMPREHEN METABOLIC PANEL: CPT
